# Patient Record
Sex: FEMALE | Race: BLACK OR AFRICAN AMERICAN | NOT HISPANIC OR LATINO | URBAN - METROPOLITAN AREA
[De-identification: names, ages, dates, MRNs, and addresses within clinical notes are randomized per-mention and may not be internally consistent; named-entity substitution may affect disease eponyms.]

---

## 2019-11-09 ENCOUNTER — INPATIENT (INPATIENT)
Facility: HOSPITAL | Age: 79
LOS: 8 days | Discharge: HOME HEALTH | End: 2019-11-18
Attending: INTERNAL MEDICINE | Admitting: INTERNAL MEDICINE
Payer: MEDICARE

## 2019-11-09 VITALS
RESPIRATION RATE: 18 BRPM | DIASTOLIC BLOOD PRESSURE: 69 MMHG | SYSTOLIC BLOOD PRESSURE: 124 MMHG | HEIGHT: 66 IN | TEMPERATURE: 101 F | HEART RATE: 118 BPM | OXYGEN SATURATION: 100 % | WEIGHT: 134.92 LBS

## 2019-11-09 LAB
ALBUMIN SERPL ELPH-MCNC: 3.1 G/DL — LOW (ref 3.3–5)
ALP SERPL-CCNC: 157 U/L — HIGH (ref 40–120)
ALT FLD-CCNC: 38 U/L — SIGNIFICANT CHANGE UP (ref 12–78)
ANION GAP SERPL CALC-SCNC: 9 MMOL/L — SIGNIFICANT CHANGE UP (ref 5–17)
APTT BLD: 24.6 SEC — LOW (ref 27.5–36.3)
AST SERPL-CCNC: 30 U/L — SIGNIFICANT CHANGE UP (ref 15–37)
BASOPHILS # BLD AUTO: 0.03 K/UL — SIGNIFICANT CHANGE UP (ref 0–0.2)
BASOPHILS NFR BLD AUTO: 0.2 % — SIGNIFICANT CHANGE UP (ref 0–2)
BILIRUB SERPL-MCNC: 0.3 MG/DL — SIGNIFICANT CHANGE UP (ref 0.2–1.2)
BUN SERPL-MCNC: 21 MG/DL — SIGNIFICANT CHANGE UP (ref 7–23)
CALCIUM SERPL-MCNC: 9.1 MG/DL — SIGNIFICANT CHANGE UP (ref 8.5–10.1)
CHLORIDE SERPL-SCNC: 100 MMOL/L — SIGNIFICANT CHANGE UP (ref 96–108)
CO2 SERPL-SCNC: 25 MMOL/L — SIGNIFICANT CHANGE UP (ref 22–31)
CREAT SERPL-MCNC: 1.22 MG/DL — SIGNIFICANT CHANGE UP (ref 0.5–1.3)
EOSINOPHIL # BLD AUTO: 0.12 K/UL — SIGNIFICANT CHANGE UP (ref 0–0.5)
EOSINOPHIL NFR BLD AUTO: 0.9 % — SIGNIFICANT CHANGE UP (ref 0–6)
GLUCOSE SERPL-MCNC: 231 MG/DL — HIGH (ref 70–99)
HCT VFR BLD CALC: 32.9 % — LOW (ref 34.5–45)
HGB BLD-MCNC: 10.8 G/DL — LOW (ref 11.5–15.5)
IMM GRANULOCYTES NFR BLD AUTO: 0.7 % — SIGNIFICANT CHANGE UP (ref 0–1.5)
INR BLD: 1.21 RATIO — HIGH (ref 0.88–1.16)
LACTATE SERPL-SCNC: 2.3 MMOL/L — HIGH (ref 0.7–2)
LYMPHOCYTES # BLD AUTO: 1.54 K/UL — SIGNIFICANT CHANGE UP (ref 1–3.3)
LYMPHOCYTES # BLD AUTO: 11.1 % — LOW (ref 13–44)
MCHC RBC-ENTMCNC: 29.7 PG — SIGNIFICANT CHANGE UP (ref 27–34)
MCHC RBC-ENTMCNC: 32.8 GM/DL — SIGNIFICANT CHANGE UP (ref 32–36)
MCV RBC AUTO: 90.4 FL — SIGNIFICANT CHANGE UP (ref 80–100)
MONOCYTES # BLD AUTO: 1.16 K/UL — HIGH (ref 0–0.9)
MONOCYTES NFR BLD AUTO: 8.4 % — SIGNIFICANT CHANGE UP (ref 2–14)
NEUTROPHILS # BLD AUTO: 10.9 K/UL — HIGH (ref 1.8–7.4)
NEUTROPHILS NFR BLD AUTO: 78.7 % — HIGH (ref 43–77)
NRBC # BLD: 0 /100 WBCS — SIGNIFICANT CHANGE UP (ref 0–0)
NT-PROBNP SERPL-SCNC: 229 PG/ML — SIGNIFICANT CHANGE UP (ref 0–450)
PLATELET # BLD AUTO: 328 K/UL — SIGNIFICANT CHANGE UP (ref 150–400)
POTASSIUM SERPL-MCNC: 4.2 MMOL/L — SIGNIFICANT CHANGE UP (ref 3.5–5.3)
POTASSIUM SERPL-SCNC: 4.2 MMOL/L — SIGNIFICANT CHANGE UP (ref 3.5–5.3)
PROT SERPL-MCNC: 7.9 GM/DL — SIGNIFICANT CHANGE UP (ref 6–8.3)
PROTHROM AB SERPL-ACNC: 13.6 SEC — HIGH (ref 10–12.9)
RBC # BLD: 3.64 M/UL — LOW (ref 3.8–5.2)
RBC # FLD: 12.6 % — SIGNIFICANT CHANGE UP (ref 10.3–14.5)
SODIUM SERPL-SCNC: 134 MMOL/L — LOW (ref 135–145)
TROPONIN I SERPL-MCNC: <.015 NG/ML — SIGNIFICANT CHANGE UP (ref 0.01–0.04)
WBC # BLD: 13.84 K/UL — HIGH (ref 3.8–10.5)
WBC # FLD AUTO: 13.84 K/UL — HIGH (ref 3.8–10.5)

## 2019-11-09 PROCEDURE — 99285 EMERGENCY DEPT VISIT HI MDM: CPT

## 2019-11-09 PROCEDURE — 71275 CT ANGIOGRAPHY CHEST: CPT | Mod: 26

## 2019-11-09 PROCEDURE — 99223 1ST HOSP IP/OBS HIGH 75: CPT | Mod: AI

## 2019-11-09 PROCEDURE — 93010 ELECTROCARDIOGRAM REPORT: CPT

## 2019-11-09 PROCEDURE — 71045 X-RAY EXAM CHEST 1 VIEW: CPT | Mod: 26

## 2019-11-09 RX ORDER — ACETAMINOPHEN 500 MG
975 TABLET ORAL ONCE
Refills: 0 | Status: COMPLETED | OUTPATIENT
Start: 2019-11-09 | End: 2019-11-09

## 2019-11-09 RX ORDER — PIPERACILLIN AND TAZOBACTAM 4; .5 G/20ML; G/20ML
3.38 INJECTION, POWDER, LYOPHILIZED, FOR SOLUTION INTRAVENOUS ONCE
Refills: 0 | Status: COMPLETED | OUTPATIENT
Start: 2019-11-09 | End: 2019-11-10

## 2019-11-09 RX ORDER — CEFTRIAXONE 500 MG/1
1000 INJECTION, POWDER, FOR SOLUTION INTRAMUSCULAR; INTRAVENOUS ONCE
Refills: 0 | Status: COMPLETED | OUTPATIENT
Start: 2019-11-09 | End: 2019-11-09

## 2019-11-09 RX ORDER — IPRATROPIUM/ALBUTEROL SULFATE 18-103MCG
3 AEROSOL WITH ADAPTER (GRAM) INHALATION ONCE
Refills: 0 | Status: COMPLETED | OUTPATIENT
Start: 2019-11-09 | End: 2019-11-10

## 2019-11-09 RX ORDER — IPRATROPIUM/ALBUTEROL SULFATE 18-103MCG
3 AEROSOL WITH ADAPTER (GRAM) INHALATION ONCE
Refills: 0 | Status: COMPLETED | OUTPATIENT
Start: 2019-11-09 | End: 2019-11-09

## 2019-11-09 RX ORDER — AZITHROMYCIN 500 MG/1
500 TABLET, FILM COATED ORAL ONCE
Refills: 0 | Status: COMPLETED | OUTPATIENT
Start: 2019-11-09 | End: 2019-11-09

## 2019-11-09 RX ORDER — SODIUM CHLORIDE 9 MG/ML
2000 INJECTION INTRAMUSCULAR; INTRAVENOUS; SUBCUTANEOUS ONCE
Refills: 0 | Status: COMPLETED | OUTPATIENT
Start: 2019-11-09 | End: 2019-11-09

## 2019-11-09 RX ADMIN — CEFTRIAXONE 100 MILLIGRAM(S): 500 INJECTION, POWDER, FOR SOLUTION INTRAMUSCULAR; INTRAVENOUS at 20:35

## 2019-11-09 RX ADMIN — Medication 975 MILLIGRAM(S): at 20:36

## 2019-11-09 RX ADMIN — Medication 125 MILLIGRAM(S): at 20:36

## 2019-11-09 RX ADMIN — SODIUM CHLORIDE 2000 MILLILITER(S): 9 INJECTION INTRAMUSCULAR; INTRAVENOUS; SUBCUTANEOUS at 20:05

## 2019-11-09 RX ADMIN — AZITHROMYCIN 500 MILLIGRAM(S): 500 TABLET, FILM COATED ORAL at 21:05

## 2019-11-09 RX ADMIN — Medication 3 MILLILITER(S): at 20:35

## 2019-11-09 RX ADMIN — Medication 3 MILLILITER(S): at 21:02

## 2019-11-09 NOTE — ED ADULT NURSE NOTE - OBJECTIVE STATEMENT
Cardiac
79 y.o female with hx of hdl, htn and cancer complains of sob and wheezes. pt is code sepsis

## 2019-11-09 NOTE — ED PROVIDER NOTE - OBJECTIVE STATEMENT
[email or web address] [phone] Pertinent PMH/PSH/FHx/SHx and Review of Systems contained within:    80yo F w PMH of HTN, DM, HL, hx thyroid surgery x3, lung ca scheduled for surgery Tuesday presents to ED for eval of SOB & wheeze.  No previous hx asthma, no hx smoking/EtOH.  Pt states sx severe x1d.  Pt febrile in triage.  Denies previous fever, recent travel, known sick contacts.  Pt states cough productive of sputum, few streaks of blood.  Denies dizziness, syncope.  PMD Dr. James Pertinent PMH/PSH/FHx/SHx and Review of Systems contained within:    80yo F w PMH of HTN, DM, HL, hx thyroid surgery x3, lung ca scheduled for surgery Tuesday presents to ED for eval of SOB & wheeze.  No previous hx asthma, no hx smoking/EtOH.  Pt states sx severe x1d.  Pt febrile in triage.  Denies previous fever, recent travel, known sick contacts.  Pt states cough productive of sputum, few streaks of blood.  Denies dizziness, syncope.  PMD Dr. James    +fever, No photophobia/eye pain/changes in vision, No ear pain/sore throat, No palpitations, +SOB/cough/wheeze, No abdominal pain, No neck/back pain, no rash, no changes in neurological status/function.

## 2019-11-09 NOTE — H&P ADULT - NSICDXPASTMEDICALHX_GEN_ALL_CORE_FT
PAST MEDICAL HISTORY:  Diabetes type 2, uncontrolled     Essential hypertension     Hypothyroidism     Lung cancer

## 2019-11-09 NOTE — H&P ADULT - HISTORY OF PRESENT ILLNESS
Pt is a 78 y/o female w/pmhx of HTN, HLD, DM2, Thyroid ca s/p surgeryx3  20+yrs ago, R lung ca s/p resection at [facility], was there for f/u in July and found to have a possible new mass on R and is scheduled for Bronch on coming tuesday.  Pt reports over the last 2-3 days she has been noticing wheezing and gets a bout of cough and along w/that sob.  she is not bringing anything up with cough. States today she had a bout of coughing and couldnt catch her breat after and son called ems.  she had denied any fevers at home, but febrile in ed.  pt denies any sick contacts or travels, no cp, palpitations, n/v/d/c.  she had seen pmd few days ago for this and was started on inhalers and states felt a bit better but when gets cough gets the sob.

## 2019-11-09 NOTE — ED ADULT TRIAGE NOTE - CHIEF COMPLAINT QUOTE
SOB for one day , blood sputum, just gat prescription for asthma meds , feels better after treatment

## 2019-11-09 NOTE — ED PROVIDER NOTE - PHYSICAL EXAMINATION
Gen: Alert, speaking in complete sentences  Head: NC, AT, EOMI, normal lids/conjunctiva  ENT: normal hearing, patent oropharynx, MMM  Neck: supple, no tenderness/meningismus, FROM, Trachea midline  Pulm: Bilateral wheeze & rales  CV: RRR, no M/R/G, +dist pulses  Abd: soft, NT/ND, +BS, no guarding/rebound tenderness  Mskel: no edema/erythema/cyanosis  Skin: no rash  Neuro: no sensory/motor deficits

## 2019-11-09 NOTE — H&P ADULT - NSHPPHYSICALEXAM_GEN_ALL_CORE
Vital Signs Last 24 Hrs  T(C): 37 (10 Nov 2019 03:36), Max: 38.3 (09 Nov 2019 19:35)  T(F): 98.6 (10 Nov 2019 03:36), Max: 100.9 (09 Nov 2019 19:35)  HR: 100 (10 Nov 2019 05:47) (84 - 118)  BP: 160/84 (10 Nov 2019 05:45) (106/69 - 160/84)  BP(mean): --  RR: 20 (10 Nov 2019 05:45) (15 - 20)  SpO2: 97% (10 Nov 2019 05:47) (97% - 100%)    PHYSICAL EXAM:    GENERAL: NAD, well-groomed, well-developed  HEAD:  Atraumatic, Normocephalic  EYES: EOMI, PERRLA, conjunctiva and sclera clear  ENMT: No tonsillar erythema, exudates, or enlargement; Moist mucous membranes, No lesions  NECK: Supple, No JVD, Normal thyroid  NERVOUS SYSTEM:  Alert & Oriented X3, Good concentration; Motor Strength 5/5 B/L upper and lower extremities; DTRs 2+ intact and symmetric  CHEST/LUNG: mild wheezing through out b/l  HEART: Regular rate and rhythm; No rubs, or gallops, +S1,S2  ABDOMEN: Soft, Nontender, Nondistended; Bowel sounds present  EXTREMITIES:  2+ Peripheral Pulses, No clubbing, cyanosis, or edema  LYMPH: No cervical adenopathy  RECTAL: deferred  BREAST: deferred  : deferred  SKIN: No rashes or lesions    IMPROVE VTE Individual Risk Assessment          RISK                                                          Points  [  ] Previous VTE                                                3  [  ] Thrombophilia                                             2  [  ] Lower limb paralysis                                    2        (unable to hold up >15 seconds)    [  ] Current Cancer                                             2         (within 6 months)  [ x ] Immobilization > 24 hrs                              1  [  ] ICU/CCU stay > 24 hours                            1  [ x ] Age > 60                                                    1  IMPROVE VTE Score __2_______

## 2019-11-09 NOTE — H&P ADULT - NSHPLABSRESULTS_GEN_ALL_CORE
LABS:                        10.8   13.84 )-----------( 328      ( 2019 20:23 )             32.9         134<L>  |  100  |  21  ----------------------------<  231<H>  4.2   |  25  |  1.22    Ca    9.1      2019 20:23    TPro  7.9  /  Alb  3.1<L>  /  TBili  0.3  /  DBili  x   /  AST  30  /  ALT  38  /  AlkPhos  157<H>      PT/INR - ( 2019 20:23 )   PT: 13.6 sec;   INR: 1.21 ratio         PTT - ( 2019 20:23 )  PTT:24.6 sec  Urinalysis Basic - ( 10 Nov 2019 00:15 )    Color: Yellow / Appearance: Clear / S.005 / pH: x  Gluc: x / Ketone: Negative  / Bili: Negative / Urobili: Negative mg/dL   Blood: x / Protein: 15 mg/dL / Nitrite: Negative   Leuk Esterase: Trace / RBC: 0-2 /HPF / WBC 3-5   Sq Epi: x / Non Sq Epi: Few / Bacteria: Occasional      CAPILLARY BLOOD GLUCOSE          RADIOLOGY & ADDITIONAL TESTS:    Imaging Personally Reviewed:  [ X] YES  [ ] NO

## 2019-11-09 NOTE — ED PROVIDER NOTE - CLINICAL SUMMARY MEDICAL DECISION MAKING FREE TEXT BOX
Pt w above dx, initially given abx for CAP, later added broad spectrum abx due to pt recent multiple hospital visits for cancer work up.  Discussed w pt PMD, admitted for further eval/mgmt.

## 2019-11-10 DIAGNOSIS — Z98.890 OTHER SPECIFIED POSTPROCEDURAL STATES: Chronic | ICD-10-CM

## 2019-11-10 DIAGNOSIS — J15.6 PNEUMONIA DUE TO OTHER GRAM-NEGATIVE BACTERIA: ICD-10-CM

## 2019-11-10 DIAGNOSIS — E11.65 TYPE 2 DIABETES MELLITUS WITH HYPERGLYCEMIA: ICD-10-CM

## 2019-11-10 DIAGNOSIS — R06.02 SHORTNESS OF BREATH: ICD-10-CM

## 2019-11-10 DIAGNOSIS — E89.0 POSTPROCEDURAL HYPOTHYROIDISM: ICD-10-CM

## 2019-11-10 DIAGNOSIS — Z29.9 ENCOUNTER FOR PROPHYLACTIC MEASURES, UNSPECIFIED: ICD-10-CM

## 2019-11-10 DIAGNOSIS — J40 BRONCHITIS, NOT SPECIFIED AS ACUTE OR CHRONIC: ICD-10-CM

## 2019-11-10 DIAGNOSIS — C34.91 MALIGNANT NEOPLASM OF UNSPECIFIED PART OF RIGHT BRONCHUS OR LUNG: ICD-10-CM

## 2019-11-10 LAB
APPEARANCE UR: CLEAR — SIGNIFICANT CHANGE UP
BACTERIA # UR AUTO: ABNORMAL
BILIRUB UR-MCNC: NEGATIVE — SIGNIFICANT CHANGE UP
COLOR SPEC: YELLOW — SIGNIFICANT CHANGE UP
DIFF PNL FLD: ABNORMAL
EPI CELLS # UR: SIGNIFICANT CHANGE UP
FLU A RESULT: SIGNIFICANT CHANGE UP
FLU A RESULT: SIGNIFICANT CHANGE UP
FLUAV AG NPH QL: SIGNIFICANT CHANGE UP
FLUBV AG NPH QL: SIGNIFICANT CHANGE UP
GLUCOSE BLDC GLUCOMTR-MCNC: 367 MG/DL — HIGH (ref 70–99)
GLUCOSE BLDC GLUCOMTR-MCNC: 371 MG/DL — HIGH (ref 70–99)
GLUCOSE BLDC GLUCOMTR-MCNC: 391 MG/DL — HIGH (ref 70–99)
GLUCOSE UR QL: 50 MG/DL
KETONES UR-MCNC: NEGATIVE — SIGNIFICANT CHANGE UP
LACTATE SERPL-SCNC: 1.5 MMOL/L — SIGNIFICANT CHANGE UP (ref 0.7–2)
LEUKOCYTE ESTERASE UR-ACNC: ABNORMAL
NITRITE UR-MCNC: NEGATIVE — SIGNIFICANT CHANGE UP
PH UR: 6 — SIGNIFICANT CHANGE UP (ref 5–8)
PROT UR-MCNC: 15 MG/DL
RBC CASTS # UR COMP ASSIST: SIGNIFICANT CHANGE UP /HPF (ref 0–4)
RSV RESULT: SIGNIFICANT CHANGE UP
RSV RNA RESP QL NAA+PROBE: SIGNIFICANT CHANGE UP
SP GR SPEC: 1 — LOW (ref 1.01–1.02)
UROBILINOGEN FLD QL: NEGATIVE MG/DL — SIGNIFICANT CHANGE UP
WBC UR QL: SIGNIFICANT CHANGE UP

## 2019-11-10 PROCEDURE — 99233 SBSQ HOSP IP/OBS HIGH 50: CPT

## 2019-11-10 RX ORDER — ACETYLCYSTEINE 200 MG/ML
2 VIAL (ML) MISCELLANEOUS EVERY 6 HOURS
Refills: 0 | Status: COMPLETED | OUTPATIENT
Start: 2019-11-10 | End: 2019-11-11

## 2019-11-10 RX ORDER — OMEPRAZOLE 10 MG/1
0 CAPSULE, DELAYED RELEASE ORAL
Qty: 30 | Refills: 0 | DISCHARGE

## 2019-11-10 RX ORDER — DEXTROSE 50 % IN WATER 50 %
12.5 SYRINGE (ML) INTRAVENOUS ONCE
Refills: 0 | Status: DISCONTINUED | OUTPATIENT
Start: 2019-11-10 | End: 2019-11-18

## 2019-11-10 RX ORDER — TIOTROPIUM BROMIDE 18 UG/1
1 CAPSULE ORAL; RESPIRATORY (INHALATION) DAILY
Refills: 0 | Status: DISCONTINUED | OUTPATIENT
Start: 2019-11-10 | End: 2019-11-18

## 2019-11-10 RX ORDER — IPRATROPIUM/ALBUTEROL SULFATE 18-103MCG
3 AEROSOL WITH ADAPTER (GRAM) INHALATION ONCE
Refills: 0 | Status: COMPLETED | OUTPATIENT
Start: 2019-11-10 | End: 2019-11-10

## 2019-11-10 RX ORDER — GLUCAGON INJECTION, SOLUTION 0.5 MG/.1ML
1 INJECTION, SOLUTION SUBCUTANEOUS ONCE
Refills: 0 | Status: DISCONTINUED | OUTPATIENT
Start: 2019-11-10 | End: 2019-11-18

## 2019-11-10 RX ORDER — INSULIN LISPRO 100/ML
VIAL (ML) SUBCUTANEOUS
Refills: 0 | Status: DISCONTINUED | OUTPATIENT
Start: 2019-11-10 | End: 2019-11-18

## 2019-11-10 RX ORDER — AZITHROMYCIN 500 MG/1
500 TABLET, FILM COATED ORAL EVERY 24 HOURS
Refills: 0 | Status: DISCONTINUED | OUTPATIENT
Start: 2019-11-10 | End: 2019-11-14

## 2019-11-10 RX ORDER — PIPERACILLIN AND TAZOBACTAM 4; .5 G/20ML; G/20ML
3.38 INJECTION, POWDER, LYOPHILIZED, FOR SOLUTION INTRAVENOUS EVERY 8 HOURS
Refills: 0 | Status: DISCONTINUED | OUTPATIENT
Start: 2019-11-10 | End: 2019-11-14

## 2019-11-10 RX ORDER — PANTOPRAZOLE SODIUM 20 MG/1
40 TABLET, DELAYED RELEASE ORAL
Refills: 0 | Status: DISCONTINUED | OUTPATIENT
Start: 2019-11-10 | End: 2019-11-18

## 2019-11-10 RX ORDER — DEXTROSE 50 % IN WATER 50 %
25 SYRINGE (ML) INTRAVENOUS ONCE
Refills: 0 | Status: DISCONTINUED | OUTPATIENT
Start: 2019-11-10 | End: 2019-11-18

## 2019-11-10 RX ORDER — IPRATROPIUM/ALBUTEROL SULFATE 18-103MCG
3 AEROSOL WITH ADAPTER (GRAM) INHALATION EVERY 6 HOURS
Refills: 0 | Status: DISCONTINUED | OUTPATIENT
Start: 2019-11-10 | End: 2019-11-12

## 2019-11-10 RX ORDER — ALBUTEROL 90 UG/1
1 AEROSOL, METERED ORAL EVERY 4 HOURS
Refills: 0 | Status: COMPLETED | OUTPATIENT
Start: 2019-11-10 | End: 2020-10-08

## 2019-11-10 RX ORDER — GABAPENTIN 400 MG/1
300 CAPSULE ORAL
Refills: 0 | Status: DISCONTINUED | OUTPATIENT
Start: 2019-11-10 | End: 2019-11-18

## 2019-11-10 RX ORDER — INSULIN LISPRO 100/ML
VIAL (ML) SUBCUTANEOUS AT BEDTIME
Refills: 0 | Status: DISCONTINUED | OUTPATIENT
Start: 2019-11-10 | End: 2019-11-18

## 2019-11-10 RX ORDER — SODIUM CHLORIDE 9 MG/ML
1000 INJECTION, SOLUTION INTRAVENOUS
Refills: 0 | Status: DISCONTINUED | OUTPATIENT
Start: 2019-11-10 | End: 2019-11-18

## 2019-11-10 RX ORDER — LOSARTAN POTASSIUM 100 MG/1
100 TABLET, FILM COATED ORAL DAILY
Refills: 0 | Status: DISCONTINUED | OUTPATIENT
Start: 2019-11-10 | End: 2019-11-18

## 2019-11-10 RX ORDER — LETROZOLE 2.5 MG/1
2.5 TABLET, FILM COATED ORAL DAILY
Refills: 0 | Status: DISCONTINUED | OUTPATIENT
Start: 2019-11-10 | End: 2019-11-18

## 2019-11-10 RX ORDER — DEXTROSE 50 % IN WATER 50 %
15 SYRINGE (ML) INTRAVENOUS ONCE
Refills: 0 | Status: DISCONTINUED | OUTPATIENT
Start: 2019-11-10 | End: 2019-11-18

## 2019-11-10 RX ORDER — LEVOTHYROXINE SODIUM 125 MCG
137 TABLET ORAL DAILY
Refills: 0 | Status: DISCONTINUED | OUTPATIENT
Start: 2019-11-10 | End: 2019-11-14

## 2019-11-10 RX ADMIN — AZITHROMYCIN 255 MILLIGRAM(S): 500 TABLET, FILM COATED ORAL at 07:40

## 2019-11-10 RX ADMIN — Medication 3 MILLILITER(S): at 02:44

## 2019-11-10 RX ADMIN — Medication 3 MILLILITER(S): at 15:09

## 2019-11-10 RX ADMIN — Medication 20 MILLIGRAM(S): at 21:58

## 2019-11-10 RX ADMIN — PIPERACILLIN AND TAZOBACTAM 25 GRAM(S): 4; .5 INJECTION, POWDER, LYOPHILIZED, FOR SOLUTION INTRAVENOUS at 16:17

## 2019-11-10 RX ADMIN — Medication 2 MILLILITER(S): at 23:46

## 2019-11-10 RX ADMIN — LETROZOLE 2.5 MILLIGRAM(S): 2.5 TABLET, FILM COATED ORAL at 12:07

## 2019-11-10 RX ADMIN — Medication 2 MILLILITER(S): at 17:05

## 2019-11-10 RX ADMIN — Medication 3 MILLILITER(S): at 17:05

## 2019-11-10 RX ADMIN — PIPERACILLIN AND TAZOBACTAM 200 GRAM(S): 4; .5 INJECTION, POWDER, LYOPHILIZED, FOR SOLUTION INTRAVENOUS at 02:14

## 2019-11-10 RX ADMIN — Medication 3 MILLILITER(S): at 11:02

## 2019-11-10 RX ADMIN — PIPERACILLIN AND TAZOBACTAM 25 GRAM(S): 4; .5 INJECTION, POWDER, LYOPHILIZED, FOR SOLUTION INTRAVENOUS at 21:58

## 2019-11-10 RX ADMIN — LOSARTAN POTASSIUM 100 MILLIGRAM(S): 100 TABLET, FILM COATED ORAL at 17:59

## 2019-11-10 RX ADMIN — Medication 3 MILLILITER(S): at 05:47

## 2019-11-10 RX ADMIN — Medication 10: at 16:17

## 2019-11-10 RX ADMIN — Medication 40 MILLIGRAM(S): at 13:52

## 2019-11-10 RX ADMIN — Medication 3 MILLILITER(S): at 23:46

## 2019-11-10 RX ADMIN — Medication 10: at 12:07

## 2019-11-10 RX ADMIN — GABAPENTIN 300 MILLIGRAM(S): 400 CAPSULE ORAL at 17:58

## 2019-11-10 RX ADMIN — Medication 6: at 22:04

## 2019-11-10 NOTE — PATIENT PROFILE ADULT - NSTOBACCONEVERSMOKERY/N_GEN_A
Ambulatory with steady gait.  No new distress and no further questions or concerns.  Expressed understanding of discharge information and medications.    
Dr. Ch at bedside.   
No

## 2019-11-10 NOTE — PHYSICAL THERAPY INITIAL EVALUATION ADULT - IMPAIRMENTS FOUND, PT EVAL
aerobic capacity/endurance/muscle strength/posture/gait, locomotion, and balance/joint integrity and mobility

## 2019-11-10 NOTE — PROGRESS NOTE ADULT - PROBLEM SELECTOR PLAN 1
- large subcarinal mass, bilateral hilar nodes, left hepatic mass.  - follow up blood cultures  - nebs treatment.  - Patient has known h/o lung cancer for which she follows at [facility] in [place].  - pulmonary and ID eval were requested.  - She will follow up with her oncologist upon discharge.

## 2019-11-10 NOTE — PHYSICAL THERAPY INITIAL EVALUATION ADULT - GENERAL OBSERVATIONS, REHAB EVAL
Chart (EMR) reviewed. Received supine c HOB elevated, NAD. +cardiac monitor, +O2 via nasal cannula, +heplock. Family present. Alert. Ox4. Able to follow multistep commands/directions.

## 2019-11-10 NOTE — PROGRESS NOTE ADULT - PROBLEM SELECTOR PLAN 5
- follow up with oncologist at [facility] upon discharge.  - She is scheduled for bronchoscopy next Tuesday.

## 2019-11-10 NOTE — PROGRESS NOTE ADULT - SUBJECTIVE AND OBJECTIVE BOX
Patient is a 79y old  Female who presents with a chief complaint of sob (2019 23:56), she has no pain, no SOB.    OVERNIGHT EVENTS: none    MEDICATIONS  (STANDING):  ALBUTerol    90 MICROgram(s) HFA Inhaler 1 Puff(s) Inhalation every 4 hours  albuterol/ipratropium for Nebulization 3 milliLiter(s) Nebulizer every 6 hours  azithromycin  IVPB 500 milliGRAM(s) IV Intermittent every 24 hours  dextrose 5%. 1000 milliLiter(s) (50 mL/Hr) IV Continuous <Continuous>  dextrose 50% Injectable 12.5 Gram(s) IV Push once  dextrose 50% Injectable 25 Gram(s) IV Push once  dextrose 50% Injectable 25 Gram(s) IV Push once  gabapentin 300 milliGRAM(s) Oral two times a day  insulin lispro (HumaLOG) corrective regimen sliding scale   SubCutaneous three times a day before meals  insulin lispro (HumaLOG) corrective regimen sliding scale   SubCutaneous at bedtime  letrozole 2.5 milliGRAM(s) Oral daily  levothyroxine 137 MICROGram(s) Oral daily  losartan 100 milliGRAM(s) Oral daily  pantoprazole    Tablet 40 milliGRAM(s) Oral before breakfast  piperacillin/tazobactam IVPB.. 3.375 Gram(s) IV Intermittent every 8 hours  tiotropium 18 MICROgram(s) Capsule 1 Capsule(s) Inhalation daily    MEDICATIONS  (PRN):  dextrose 40% Gel 15 Gram(s) Oral once PRN Blood Glucose LESS THAN 70 milliGRAM(s)/deciliter  glucagon  Injectable 1 milliGRAM(s) IntraMuscular once PRN Glucose LESS THAN 70 milligrams/deciliter    REVIEW OF SYSTEMS:  CONSTITUTIONAL: No fever,    EYES: No eye pain,    ENMT:    No sinus or throat pain  NECK: No pain or stiffness  RESPIRATORY:  No shortness of breath  CARDIOVASCULAR: No chest pain, palpitations,   GASTROINTESTINAL: No abdominal or epigastric pain. No nausea, vomiting,   GENITOURINARY: No dysuria,    NEUROLOGICAL: No headaches,    SKIN: No itching,       Vital Signs Last 24 Hrs  T(C): 37 (10 Nov 2019 03:36), Max: 38.3 (2019 19:35)  T(F): 98.6 (10 Nov 2019 03:36), Max: 100.9 (2019 19:35)  HR: 100 (10 Nov 2019 05:47) (84 - 118)  BP: 160/84 (10 Nov 2019 05:45) (106/69 - 160/84)  BP(mean): --  RR: 20 (10 Nov 2019 05:45) (15 - 20)  SpO2: 97% (10 Nov 2019 05:47) (97% - 100%)    PHYSICAL EXAM:  GENERAL: NAD, well-groomed, well-developed  HEAD:  Atraumatic, Normocephalic  EYES: EOMI, PERRLA, conjunctiva and sclera clear  ENMT:  Moist mucous membranes   NECK: Supple, No JVD   NERVOUS SYSTEM:  Alert & Oriented X 3, Good concentration; Motor Strength 5/5 B/L upper and lower extremities; DTRs 2+ intact and symmetric  CHEST/LUNG: Good air entry bilaterally with decreased breath sounds at bases; mild to moderate wheezing, or rubs  HEART: Regular rate and rhythm; No murmurs, rubs, or gallops  ABDOMEN: Soft, Nontender, Nondistended; Bowel sounds present  EXTREMITIES:  2+ Peripheral Pulses, No clubbing, cyanosis, or edema  LYMPH: No lymphadenopathy noted  SKIN: No rashes or lesions    LABS:                        10.8   13.84 )-----------( 328      ( 2019 20:23 )             32.9         134<L>  |  100  |  21  ----------------------------<  231<H>  4.2   |  25  |  1.22    Ca    9.1      2019 20:23    TPro  7.9  /  Alb  3.1<L>  /  TBili  0.3  /  DBili  x   /  AST  30  /  ALT  38  /  AlkPhos  157<H>  11-09    PT/INR - ( 2019 20:23 )   PT: 13.6 sec;   INR: 1.21 ratio         PTT - ( 2019 20:23 )  PTT:24.6 sec   cardiac markers Troponin <.015  CK --    Urinalysis Basic - ( 10 Nov 2019 00:15 )    Color: Yellow / Appearance: Clear / S.005 / pH: x  Gluc: x / Ketone: Negative  / Bili: Negative / Urobili: Negative mg/dL   Blood: x / Protein: 15 mg/dL / Nitrite: Negative   Leuk Esterase: Trace / RBC: 0-2 /HPF / WBC 3-5   Sq Epi: x / Non Sq Epi: Few / Bacteria: Occasional      CAPILLARY BLOOD GLUCOSE        Cultures    RADIOLOGY & ADDITIONAL TESTS:    Imaging Personally Reviewed:  [ ] YES  [ ] NO    Consultant(s) Notes Reviewed:  [ ] YES  [ ] NO    Care Discussed with Consultants/Other Providers [ ] YES  [ ] NO

## 2019-11-10 NOTE — PHYSICAL THERAPY INITIAL EVALUATION ADULT - ADDITIONAL COMMENTS
Patient lives c son in a pvt house c 7 entry steps c B/L rails, 1 level inside home. Independent c all ADL's and ambulation with straight cane.

## 2019-11-10 NOTE — ED ADULT NURSE REASSESSMENT NOTE - NS ED NURSE REASSESS COMMENT FT1
Assumed care of pt at 2300 hours, needs attended to. Will continue to monitor and update with changes

## 2019-11-10 NOTE — PHYSICAL THERAPY INITIAL EVALUATION ADULT - GAIT TRAINING, PT EVAL
Pt will improve ambulation to ~ 300 feet Independently using straight cane within 2 weeks. Pt will negotiate ~ 7 steps c straight cane and rail Independently within 2 weeks.

## 2019-11-10 NOTE — CONSULT NOTE ADULT - SUBJECTIVE AND OBJECTIVE BOX
Patient is a 79y old  Female who presents with a chief complaint of sob (10 Nov 2019 10:14)    HPI:  78 y/o female with  HTN, HLD, DM2, Thyroid ca s/p surgeryx3  20+yrs ago, Hypothyroidism, R lung CA(says thyroid metastatic) s/p Radiation  at [facility] was there for f/u in July and found to have a possible new mass on  and is scheduled for Bronch on coming tuesday.    Pt reports over the last 2-3 days she has been noticing wheezing and gets a bout of cough and along w/that sob.  Not bringing anything up with cough. Had a bout of coughing and couldn't catch her breat afterwards, so son called EMS.  Denies any fevers at home, but febrile in ED..  No sick contacts or travels, no cp, palpitations, n/v/d/c.  Seen by  PMD  few days ago for this and was started on inhalers and states felt a bit better but when gets cough gets the sob.  Non smoker.    PAST MEDICAL & SURGICAL HISTORY:  Diabetes type 2, uncontrolled  Essential hypertension  Lung cancer  Hypothyroidism  History of lung surgery  S/P thyroidectomy    FAMILY HISTORY:  No pertinent family history in first degree relatives    SOCIAL HISTORY: BMI (kg/m2): 27.6 . non smoker    Allergies  sulfa drugs (Unknown)    MEDICATIONS  (STANDING):  ALBUTerol    90 MICROgram(s) HFA Inhaler 1 Puff(s) Inhalation every 4 hours  albuterol/ipratropium for Nebulization 3 milliLiter(s) Nebulizer every 6 hours  azithromycin  IVPB 500 milliGRAM(s) IV Intermittent every 24 hours  dextrose 5%. 1000 milliLiter(s) (50 mL/Hr) IV Continuous <Continuous>  dextrose 50% Injectable 12.5 Gram(s) IV Push once  dextrose 50% Injectable 25 Gram(s) IV Push once  dextrose 50% Injectable 25 Gram(s) IV Push once  gabapentin 300 milliGRAM(s) Oral two times a day  insulin lispro (HumaLOG) corrective regimen sliding scale   SubCutaneous three times a day before meals  insulin lispro (HumaLOG) corrective regimen sliding scale   SubCutaneous at bedtime  letrozole 2.5 milliGRAM(s) Oral daily  levothyroxine 137 MICROGram(s) Oral daily  losartan 100 milliGRAM(s) Oral daily  methylPREDNISolone sodium succinate Injectable 40 milliGRAM(s) IV Push three times a day  pantoprazole    Tablet 40 milliGRAM(s) Oral before breakfast  piperacillin/tazobactam IVPB.. 3.375 Gram(s) IV Intermittent every 8 hours  tiotropium 18 MICROgram(s) Capsule 1 Capsule(s) Inhalation daily    MEDICATIONS  (PRN):  dextrose 40% Gel 15 Gram(s) Oral once PRN Blood Glucose LESS THAN 70 milliGRAM(s)/deciliter  glucagon  Injectable 1 milliGRAM(s) IntraMuscular once PRN Glucose LESS THAN 70 milligrams/deciliter    REVIEW OF SYSTEMS:    Constitutional:            No fever, weight loss or fatigue  HEENT:                      No difficulty hearing, tinnitus, vertigo; No sinus or throat pain  Respiratory:               sob, cough  Cardiovascular:           No chest pain, palpitations  Gastrointestinal:        No abdominal or epigastric pain. No N/V/diarrhea or hematemesis  Genitourinary:            No dysuria, frequency, hematuria or incontinence  SKIN:                             no rash  Musculoskeletal:        No joint pain or swelling  Extremities:                No swelling  Neurological:              No headaches  Psychiatric:                 No depression, anxiety    MACRA & MIPS:  Vaccines - Influenza: no and Pneumovax: No  Tobacco: no  Blood Pressure Screening / Control of: 160/84  Current Medications Reviewed:    Vital Signs Last 24 Hrs  T(C): 36.8 (10 Nov 2019 10:42), Max: 38.3 (2019 19:35)  T(F): 98.2 (10 Nov 2019 10:42), Max: 100.9 (2019 19:35)  HR: 109 (10 Nov 2019 11:11) (84 - 118)  BP: 145/66 (10 Nov 2019 10:42) (106/69 - 160/84)  BP(mean): --  RR: 18 (10 Nov 2019 10:42) (15 - 20)  SpO2: 97% (10 Nov 2019 11:11) (93% - 100%)    PHYSICAL EXAM:  GEN:         Awake, responsive and comfortable.  HEENT:    Normal.    RESP:        decreased air entry.  CVS:             Regular rate and rhythm.   ABD:         Soft, non-tender, non-distended;   :             No costovertebral angle tenderness  SKIN:           Warm and dry.  EXTR:            No clubbing, cyanosis or edema  CNS:              Intact sensory and motor function.  PSYCH:        cooperative, no anxiety or depression    LABS:                        10.8   13.84 )-----------( 328      ( 2019 20:23 )             32.9     11-09    134<L>  |  100  |  21  ----------------------------<  231<H>  4.2   |  25  |  1.22    Ca    9.1      2019 20:23    TPro  7.9  /  Alb  3.1<L>  /  TBili  0.3  /  DBili  x   /  AST  30  /  ALT  38  /  AlkPhos  157<H>      PT/INR - ( 2019 20:23 )   PT: 13.6 sec;   INR: 1.21 ratio         PTT - ( 2019 20:23 )  PTT:24.6 sec    Urinalysis Basic - ( 10 Nov 2019 00:15 )    Color: Yellow / Appearance: Clear / S.005 / pH: x  Gluc: x / Ketone: Negative  / Bili: Negative / Urobili: Negative mg/dL   Blood: x / Protein: 15 mg/dL / Nitrite: Negative   Leuk Esterase: Trace / RBC: 0-2 /HPF / WBC 3-5   Sq Epi: x / Non Sq Epi: Few / Bacteria: Occasional    EKG: sinus tachycardia.    RADIOLOGY & ADDITIONAL STUDIES:  < from: CT Angio Chest w/ IV Cont (19 @ 23:01) >    EXAM:  CT ANGIO CHEST (W)AW IC                          PROCEDURE DATE:  2019      INTERPRETATION:  CLINICAL INFORMATION: Hemoptysis. History of lung cancer.    COMPARISON: Same day radiograph    PROCEDURE:   CT Angiography of the Chest.  90 ml of Omnipaque 350 was injected intravenously. 10 ml were discarded.  Sagittal and coronal reformats were performed as well as 3D (MIP)   reconstructions.    FINDINGS:    LUNGS AND AIRWAYS: Patent central airways.  Lungs are clear.    PLEURA:No pleural effusion.    MEDIASTINUM AND TOI: There is a 4.9 x 3.6 cm sized subcarinal soft   tissue density displacing and encroaching the airways especially in the   right (4-238). Ill-defined soft tissue densities also seen in the right   infrahilar region. A subcentimeter left hilar node is also seen (4-240).   There is mild focal consolidation in the right middle lobe and right   lower lobe which is either postobstructive pneumonitis or pneumonia   (4-275).    VESSELS: No pulmonary thromboembolism in the pulmonary trunk, right and   left main and lobar arteries. Limited evaluation of the segmental   branches.    HEART: Heart size is normal. No pericardial effusion.    CHEST WALL AND LOWER NECK: Within normal limits.    VISUALIZED UPPER ABDOMEN: 3.6 cm sized ill-defined hypodense lesion in   the left hepatic lobe (4-477). Nodular thickening of the left adrenal   gland. 1.2 cm sized left renal cyst.    BONES: Within normal limits.    IMPRESSION:     No central pulmonary embolism.    Large subcarinal mass with bihilar lymph nodes.    Obstructive pneumonitis versus pneumonia in the right middle and lower   lobes.    Left hepatic mass. Cannot exclude malignancy (primary or metastatic).   Further characterization is recommended.    MAYO MISTRY M.D., ATTENDING RADIOLOGIST  This document has been electronically signed. 2019 11:24PM      ASSESSMENT AND PLAN:  ·	SOB.  ·	RML Pneumonia, likely post obstructive.  ·	Lung Mass.  ·	Hypothyroidism.  ·	Thyroid CA history.  ·	HTN.  ·	DM.  ·	Leukocytosis.  ·	Anemia.    Acuteness of symptoms, CT chest findings, fever, leukocytosis and elevated procalcitonin.  Will treat as pneumonia, continue antibiotics.  Continue nebulizer, add Mucomyst.  Short  course of steroids.  Discussed with sons at bed side,

## 2019-11-10 NOTE — PHYSICAL THERAPY INITIAL EVALUATION ADULT - ACTIVE RANGE OF MOTION EXAMINATION, REHAB EVAL
bilateral lower extremity Active ROM was WNL (within normal limits)/sukhjinder. upper extremity Active ROM was WNL (within normal limits)

## 2019-11-11 DIAGNOSIS — J96.01 ACUTE RESPIRATORY FAILURE WITH HYPOXIA: ICD-10-CM

## 2019-11-11 LAB
ALBUMIN SERPL ELPH-MCNC: 3.1 G/DL — LOW (ref 3.3–5)
ALP SERPL-CCNC: 148 U/L — HIGH (ref 40–120)
ALT FLD-CCNC: 34 U/L — SIGNIFICANT CHANGE UP (ref 12–78)
ANION GAP SERPL CALC-SCNC: 6 MMOL/L — SIGNIFICANT CHANGE UP (ref 5–17)
AST SERPL-CCNC: 17 U/L — SIGNIFICANT CHANGE UP (ref 15–37)
BILIRUB SERPL-MCNC: 0.3 MG/DL — SIGNIFICANT CHANGE UP (ref 0.2–1.2)
BUN SERPL-MCNC: 23 MG/DL — SIGNIFICANT CHANGE UP (ref 7–23)
CALCIUM SERPL-MCNC: 9.6 MG/DL — SIGNIFICANT CHANGE UP (ref 8.5–10.1)
CHLORIDE SERPL-SCNC: 102 MMOL/L — SIGNIFICANT CHANGE UP (ref 96–108)
CO2 SERPL-SCNC: 28 MMOL/L — SIGNIFICANT CHANGE UP (ref 22–31)
CREAT SERPL-MCNC: 1.22 MG/DL — SIGNIFICANT CHANGE UP (ref 0.5–1.3)
CULTURE RESULTS: SIGNIFICANT CHANGE UP
GLUCOSE BLDC GLUCOMTR-MCNC: 299 MG/DL — HIGH (ref 70–99)
GLUCOSE BLDC GLUCOMTR-MCNC: 327 MG/DL — HIGH (ref 70–99)
GLUCOSE BLDC GLUCOMTR-MCNC: 354 MG/DL — HIGH (ref 70–99)
GLUCOSE BLDC GLUCOMTR-MCNC: 411 MG/DL — HIGH (ref 70–99)
GLUCOSE BLDC GLUCOMTR-MCNC: 411 MG/DL — HIGH (ref 70–99)
GLUCOSE SERPL-MCNC: 316 MG/DL — HIGH (ref 70–99)
HCT VFR BLD CALC: 34.6 % — SIGNIFICANT CHANGE UP (ref 34.5–45)
HGB BLD-MCNC: 11.1 G/DL — LOW (ref 11.5–15.5)
MCHC RBC-ENTMCNC: 29.4 PG — SIGNIFICANT CHANGE UP (ref 27–34)
MCHC RBC-ENTMCNC: 32.1 GM/DL — SIGNIFICANT CHANGE UP (ref 32–36)
MCV RBC AUTO: 91.8 FL — SIGNIFICANT CHANGE UP (ref 80–100)
NRBC # BLD: 0 /100 WBCS — SIGNIFICANT CHANGE UP (ref 0–0)
PLATELET # BLD AUTO: 353 K/UL — SIGNIFICANT CHANGE UP (ref 150–400)
POTASSIUM SERPL-MCNC: 4.7 MMOL/L — SIGNIFICANT CHANGE UP (ref 3.5–5.3)
POTASSIUM SERPL-SCNC: 4.7 MMOL/L — SIGNIFICANT CHANGE UP (ref 3.5–5.3)
PROT SERPL-MCNC: 7.9 GM/DL — SIGNIFICANT CHANGE UP (ref 6–8.3)
RBC # BLD: 3.77 M/UL — LOW (ref 3.8–5.2)
RBC # FLD: 12.8 % — SIGNIFICANT CHANGE UP (ref 10.3–14.5)
SODIUM SERPL-SCNC: 136 MMOL/L — SIGNIFICANT CHANGE UP (ref 135–145)
SPECIMEN SOURCE: SIGNIFICANT CHANGE UP
WBC # BLD: 12.35 K/UL — HIGH (ref 3.8–10.5)
WBC # FLD AUTO: 12.35 K/UL — HIGH (ref 3.8–10.5)

## 2019-11-11 PROCEDURE — 99233 SBSQ HOSP IP/OBS HIGH 50: CPT

## 2019-11-11 RX ORDER — IPRATROPIUM/ALBUTEROL SULFATE 18-103MCG
3 AEROSOL WITH ADAPTER (GRAM) INHALATION ONCE
Refills: 0 | Status: COMPLETED | OUTPATIENT
Start: 2019-11-11 | End: 2019-11-11

## 2019-11-11 RX ADMIN — Medication 3 MILLILITER(S): at 11:29

## 2019-11-11 RX ADMIN — Medication 12: at 07:57

## 2019-11-11 RX ADMIN — Medication 6: at 12:09

## 2019-11-11 RX ADMIN — PIPERACILLIN AND TAZOBACTAM 25 GRAM(S): 4; .5 INJECTION, POWDER, LYOPHILIZED, FOR SOLUTION INTRAVENOUS at 16:24

## 2019-11-11 RX ADMIN — Medication 3 MILLILITER(S): at 17:41

## 2019-11-11 RX ADMIN — Medication 3 MILLILITER(S): at 05:01

## 2019-11-11 RX ADMIN — Medication 20 MILLIGRAM(S): at 21:13

## 2019-11-11 RX ADMIN — Medication 3 MILLILITER(S): at 06:02

## 2019-11-11 RX ADMIN — Medication 10: at 17:17

## 2019-11-11 RX ADMIN — LOSARTAN POTASSIUM 100 MILLIGRAM(S): 100 TABLET, FILM COATED ORAL at 05:45

## 2019-11-11 RX ADMIN — GABAPENTIN 300 MILLIGRAM(S): 400 CAPSULE ORAL at 17:18

## 2019-11-11 RX ADMIN — Medication 137 MICROGRAM(S): at 05:45

## 2019-11-11 RX ADMIN — Medication 20 MILLIGRAM(S): at 16:24

## 2019-11-11 RX ADMIN — Medication 20 MILLIGRAM(S): at 05:45

## 2019-11-11 RX ADMIN — Medication 2 MILLILITER(S): at 05:01

## 2019-11-11 RX ADMIN — AZITHROMYCIN 255 MILLIGRAM(S): 500 TABLET, FILM COATED ORAL at 07:16

## 2019-11-11 RX ADMIN — GABAPENTIN 300 MILLIGRAM(S): 400 CAPSULE ORAL at 05:44

## 2019-11-11 RX ADMIN — PIPERACILLIN AND TAZOBACTAM 25 GRAM(S): 4; .5 INJECTION, POWDER, LYOPHILIZED, FOR SOLUTION INTRAVENOUS at 05:46

## 2019-11-11 RX ADMIN — Medication 4: at 21:13

## 2019-11-11 RX ADMIN — PANTOPRAZOLE SODIUM 40 MILLIGRAM(S): 20 TABLET, DELAYED RELEASE ORAL at 06:02

## 2019-11-11 RX ADMIN — LETROZOLE 2.5 MILLIGRAM(S): 2.5 TABLET, FILM COATED ORAL at 12:09

## 2019-11-11 RX ADMIN — PIPERACILLIN AND TAZOBACTAM 25 GRAM(S): 4; .5 INJECTION, POWDER, LYOPHILIZED, FOR SOLUTION INTRAVENOUS at 21:12

## 2019-11-11 NOTE — PROGRESS NOTE ADULT - ASSESSMENT
R lung ca s/p resection /  thyroid metastatic s/p Radiation at [facility] present with SOB , likely obstructive pneumonia    on zosyn and zithromax   FU pna work up  fu cultures   NOTE TO CONTINUE   EVALAUTION IN PROGRESS R lung ca s/p resection /  thyroid metastatic s/p Radiation at [facility] present with SOB , likely obstructive pneumonia    on zosyn and zithromax   FU pna work up  fu cultures   procalcitonin will not be reliable on metastasis pt   we will fu  thanks

## 2019-11-11 NOTE — PROGRESS NOTE ADULT - SUBJECTIVE AND OBJECTIVE BOX
Patient is a 79y old  Female who presents with a chief complaint of sob (2019 10:08)      OVERNIGHT EVENTS: none     REVIEW OF SYSTEMS: denies chest pain/SOB, diaphoresis, no F/C, cough, dizziness, headache, blurry vision, nausea, vomiting, abdominal pain. All others review of systems negative     MEDICATIONS  (STANDING):  ALBUTerol    90 MICROgram(s) HFA Inhaler 1 Puff(s) Inhalation every 4 hours  albuterol/ipratropium for Nebulization 3 milliLiter(s) Nebulizer every 6 hours  azithromycin  IVPB 500 milliGRAM(s) IV Intermittent every 24 hours  dextrose 5%. 1000 milliLiter(s) (50 mL/Hr) IV Continuous <Continuous>  dextrose 50% Injectable 12.5 Gram(s) IV Push once  dextrose 50% Injectable 25 Gram(s) IV Push once  dextrose 50% Injectable 25 Gram(s) IV Push once  gabapentin 300 milliGRAM(s) Oral two times a day  insulin lispro (HumaLOG) corrective regimen sliding scale   SubCutaneous three times a day before meals  insulin lispro (HumaLOG) corrective regimen sliding scale   SubCutaneous at bedtime  letrozole 2.5 milliGRAM(s) Oral daily  levothyroxine 137 MICROGram(s) Oral daily  losartan 100 milliGRAM(s) Oral daily  methylPREDNISolone sodium succinate Injectable 20 milliGRAM(s) IV Push every 8 hours  pantoprazole    Tablet 40 milliGRAM(s) Oral before breakfast  piperacillin/tazobactam IVPB.. 3.375 Gram(s) IV Intermittent every 8 hours  tiotropium 18 MICROgram(s) Capsule 1 Capsule(s) Inhalation daily    MEDICATIONS  (PRN):  dextrose 40% Gel 15 Gram(s) Oral once PRN Blood Glucose LESS THAN 70 milliGRAM(s)/deciliter  glucagon  Injectable 1 milliGRAM(s) IntraMuscular once PRN Glucose LESS THAN 70 milligrams/deciliter      Allergies    sulfa drugs (Unknown)    Intolerances        T(F): 99.1 (19 @ 12:17), Max: 99.1 (19 @ 12:17)  HR: 107 (19 @ 12:17) (79 - 109)  BP: 146/72 (19 @ 12:17) (146/72 - 162/86)  RR: 18 (19 @ 12:17) (18 - 18)  SpO2: 98% (19 @ 12:17) (96% - 99%)  Wt(kg): --    PHYSICAL EXAM:  GENERAL: NAD, well-groomed, well-developed  HEAD:  Atraumatic, Normocephalic  EYES: EOMI, PERRLA, conjunctiva and sclera clear  ENMT: Moist mucous membranes  NECK: Supple, No JVD, Normal thyroid  NERVOUS SYSTEM:  Alert & Oriented X3, Good concentration; Motor Strength 5/5 B/L upper and lower extremities;   CHEST/LUNG: decreased BS bilaterally;   HEART: Regular rate and rhythm; No murmurs, rubs, or gallops  ABDOMEN: Soft, Nontender, Nondistended; Bowel sounds present  EXTREMITIES:  2+ Peripheral Pulses, No clubbing, cyanosis, or edema BL LE  SKIN: No rashes or lesions    LABS:                        11.   12.35 )-----------( 353      ( 2019 06:24 )             34.6         136  |  102  |  23  ----------------------------<  316<H>  4.7   |  28  |  1.22    Ca    9.6      2019 06:24    TPro  7.9  /  Alb  3.1<L>  /  TBili  0.3  /  DBili  x   /  AST  17  /  ALT  34  /  AlkPhos  148<H>      PT/INR - ( 2019 20:23 )   PT: 13.6 sec;   INR: 1.21 ratio         PTT - ( 2019 20:23 )  PTT:24.6 sec  Urinalysis Basic - ( 10 Nov 2019 00:15 )    Color: Yellow / Appearance: Clear / S.005 / pH: x  Gluc: x / Ketone: Negative  / Bili: Negative / Urobili: Negative mg/dL   Blood: x / Protein: 15 mg/dL / Nitrite: Negative   Leuk Esterase: Trace / RBC: 0-2 /HPF / WBC 3-5   Sq Epi: x / Non Sq Epi: Few / Bacteria: Occasional      Cultures;   CAPILLARY BLOOD GLUCOSE      POCT Blood Glucose.: 299 mg/dL (2019 12:07)  POCT Blood Glucose.: 411 mg/dL (2019 07:36)  POCT Blood Glucose.: 411 mg/dL (2019 07:34)  POCT Blood Glucose.: 371 mg/dL (10 Nov 2019 22:01)    Lipid panel:     CARDIAC MARKERS ( 2019 20:23 )  <.015 ng/mL / x     / x     / x     / x            RADIOLOGY & ADDITIONAL TESTS:    Imaging Personally Reviewed:  [x ] YES    < from: CT Angio Chest w/ IV Cont (19 @ 23:01) >  No central pulmonary embolism.    Large subcarinal mass with bihilar lymph nodes.    Obstructive pneumonitis versus pneumonia in the right middle and lower   lobes.    Left hepatic mass. Cannot exclude malignancy (primary or metastatic).   Further characterization is recommended.    < from: CT Angio Chest w/ IV Cont (19 @ 23:01) >  No central pulmonary embolism.    Large subcarinal mass with bihilar lymph nodes.    Obstructive pneumonitis versus pneumonia in the right middle and lower   lobes.    Left hepatic mass. Cannot exclude malignancy (primary or metastatic).   Further characterization is recommended.    Consultant(s) Notes Reviewed:  [x ] YES     Care Discussed with [x ] Consultants [X ] Patient [x ] Family  [x ]    [x ]  Other; RN

## 2019-11-11 NOTE — PROGRESS NOTE ADULT - PROBLEM SELECTOR PLAN 4
- follow up with oncologist at [facility] upon discharge.  - She is scheduled for bronchoscopy next Tuesday.

## 2019-11-11 NOTE — PROGRESS NOTE ADULT - SUBJECTIVE AND OBJECTIVE BOX
78 y/o lady with PMH of HTN, HLD, DM2, Thyroid ca s/p surgeryx3  20+yrs ago, R lung ca s/p resection at [facility], was there for f/u in July and found to have a possible new mass on  and is scheduled for Bronch on coming tuesday.  Pt reports over the last 2-3 days she has been noticing wheezing and gets a bout of cough and along with that sob.  she is not bringing anything up with cough. States today she had a bout of coughing and couldnt catch her breat after and son called ems.  she had denied any fevers at home, but febrile in ed.  pt denies any sick contacts or travels, no cp, palpitations, n/v/d/c.  she had seen pmd few days ago for this and was started on inhalers and states felt a bit better but when gets cough gets the sob. (2019 23:56)      Allergies    sulfa drugs (Unknown)    Intolerances        MEDICATIONS  (STANDING):  ALBUTerol    90 MICROgram(s) HFA Inhaler 1 Puff(s) Inhalation every 4 hours  albuterol/ipratropium for Nebulization 3 milliLiter(s) Nebulizer every 6 hours  azithromycin  IVPB 500 milliGRAM(s) IV Intermittent every 24 hours  dextrose 5%. 1000 milliLiter(s) (50 mL/Hr) IV Continuous <Continuous>  dextrose 50% Injectable 12.5 Gram(s) IV Push once  dextrose 50% Injectable 25 Gram(s) IV Push once  dextrose 50% Injectable 25 Gram(s) IV Push once  gabapentin 300 milliGRAM(s) Oral two times a day  insulin lispro (HumaLOG) corrective regimen sliding scale   SubCutaneous three times a day before meals  insulin lispro (HumaLOG) corrective regimen sliding scale   SubCutaneous at bedtime  letrozole 2.5 milliGRAM(s) Oral daily  levothyroxine 137 MICROGram(s) Oral daily  losartan 100 milliGRAM(s) Oral daily  methylPREDNISolone sodium succinate Injectable 20 milliGRAM(s) IV Push every 8 hours  pantoprazole    Tablet 40 milliGRAM(s) Oral before breakfast  piperacillin/tazobactam IVPB.. 3.375 Gram(s) IV Intermittent every 8 hours  tiotropium 18 MICROgram(s) Capsule 1 Capsule(s) Inhalation daily      REVIEW OF SYSTEMS: IN PROGRESS     CONSTITUTIONAL: No fever, chills, weight loss, or fatigue  HEENT: No sore throat, runny nose, ear ache  RESPIRATORY: No cough, wheezing, No shortness of breath  CARDIOVASCULAR: No chest pain, palpitations, dizziness  GASTROINTESTINAL: No abdominal pain. No nausea, vomiting, diarrhea  GENITOURINARY: No dysuria, increase frequency, hematuria, or incontinence  NEUROLOGICAL: No headaches, memory loss, loss of strength, numbness, or tremors, no weakness  EXTREMITY: No pedal edema BLE  SKIN: No itching, burning, rashes, or lesions     VITAL SIGNS:  T(C): 36.4 (19 @ 05:10), Max: 36.8 (11-10-19 @ 10:42)  T(F): 97.6 (19 @ 05:10), Max: 98.3 (19 @ 00:33)  HR: 98 (19 @ 05:10) (79 - 116)  BP: 152/85 (19 @ 05:10) (145/66 - 162/86)  RR: 18 (19 @ 05:10) (18 - 18)  SpO2: 98% (19 @ 05:10) (93% - 99%)  Wt(kg): --    PHYSICAL EXAM: IN PROGRESS    GENERAL: not in any distress  HEENT: Neck is supple, normocephalic, atraumatic   CHEST/LUNG: Clear to percussion bilaterally; No rales, rhonchi, wheezing  HEART: Regular rate and rhythm; No murmurs, rubs, or gallops  ABDOMEN: Soft, Nontender, Nondistended; Bowel sounds present, no rebound   EXTREMITIES:  2+ Peripheral Pulses, No clubbing, cyanosis, or edema  GENITOURINARY:   SKIN: No rashes or lesions  BACK: no pressor sore   NERVOUS SYSTEM:  Alert & Oriented     LABS:                         11.1   12.35 )-----------( 353      ( 2019 06:24 )             34.6         136  |  102  |  23  ----------------------------<  316<H>  4.7   |  28  |  1.22    Ca    9.6      2019 06:24    TPro  7.9  /  Alb  3.1<L>  /  TBili  0.3  /  DBili  x   /  AST  17  /  ALT  34  /  AlkPhos  148<H>  11-11    LIVER FUNCTIONS - ( 2019 06:24 )  Alb: 3.1 g/dL / Pro: 7.9 gm/dL / ALK PHOS: 148 U/L / ALT: 34 U/L / AST: 17 U/L / GGT: x           PT/INR - ( 2019 20:23 )   PT: 13.6 sec;   INR: 1.21 ratio         PTT - ( 2019 20:23 )  PTT:24.6 sec  Urinalysis Basic - ( 10 Nov 2019 00:15 )    Color: Yellow / Appearance: Clear / S.005 / pH: x  Gluc: x / Ketone: Negative  / Bili: Negative / Urobili: Negative mg/dL   Blood: x / Protein: 15 mg/dL / Nitrite: Negative   Leuk Esterase: Trace / RBC: 0-2 /HPF / WBC 3-5   Sq Epi: x / Non Sq Epi: Few / Bacteria: Occasional        CARDIAC MARKERS ( 2019 20:23 )  <.015 ng/mL / x     / x     / x     / x                        Culture Results:   <10,000 CFU/mL Normal Urogenital Iram (11-10 @ 11:08)  Culture Results:   No growth to date. (11-10 @ 00:46)  Culture Results:   No growth to date. (11-10 @ 00:46)                Radiology: 80 y/o lady with PMH of HTN, HLD, DM2, Thyroid ca s/p surgeryx3  20+yrs ago, R lung ca s/p resection at [facility], was there for f/u in July and found to have a possible new mass on  and is scheduled for Bronch on coming tuesday.  Pt reports over the last 2-3 days she has been noticing wheezing and gets a bout of cough and along with that sob.  she is not bringing anything up with cough. States today she had a bout of coughing and couldnt catch her breat after and son called ems.  she had denied any fevers at home, but febrile in ed.  pt denies any sick contacts or travels, no cp, palpitations, n/v/d/c.  she had seen pmd few days ago for this and was started on inhalers and states felt a bit better but when gets cough gets the sob. (2019 23:56)      Allergies    sulfa drugs (Unknown)    Intolerances        MEDICATIONS  (STANDING):  ALBUTerol    90 MICROgram(s) HFA Inhaler 1 Puff(s) Inhalation every 4 hours  albuterol/ipratropium for Nebulization 3 milliLiter(s) Nebulizer every 6 hours  azithromycin  IVPB 500 milliGRAM(s) IV Intermittent every 24 hours  dextrose 5%. 1000 milliLiter(s) (50 mL/Hr) IV Continuous <Continuous>  dextrose 50% Injectable 12.5 Gram(s) IV Push once  dextrose 50% Injectable 25 Gram(s) IV Push once  dextrose 50% Injectable 25 Gram(s) IV Push once  gabapentin 300 milliGRAM(s) Oral two times a day  insulin lispro (HumaLOG) corrective regimen sliding scale   SubCutaneous three times a day before meals  insulin lispro (HumaLOG) corrective regimen sliding scale   SubCutaneous at bedtime  letrozole 2.5 milliGRAM(s) Oral daily  levothyroxine 137 MICROGram(s) Oral daily  losartan 100 milliGRAM(s) Oral daily  methylPREDNISolone sodium succinate Injectable 20 milliGRAM(s) IV Push every 8 hours  pantoprazole    Tablet 40 milliGRAM(s) Oral before breakfast  piperacillin/tazobactam IVPB.. 3.375 Gram(s) IV Intermittent every 8 hours  tiotropium 18 MICROgram(s) Capsule 1 Capsule(s) Inhalation daily      REVIEW OF SYSTEMS:   CONSTITUTIONAL: No fever, chills, weight loss, or fatigue  HEENT: No sore throat, runny nose, ear ache  RESPIRATORY: No cough, wheezing, No shortness of breath  CARDIOVASCULAR: No chest pain, palpitations, dizziness  GASTROINTESTINAL: No abdominal pain. No nausea, vomiting, diarrhea  GENITOURINARY: No dysuria, increase frequency, hematuria, or incontinence  NEUROLOGICAL: No headaches, memory loss, loss of strength, numbness, or tremors, no weakness  EXTREMITY: No pedal edema BLE  SKIN: No itching, burning, rashes, or lesions     VITAL SIGNS:  T(C): 36.4 (19 @ 05:10), Max: 36.8 (11-10-19 @ 10:42)  T(F): 97.6 (19 @ 05:10), Max: 98.3 (19 @ 00:33)  HR: 98 (19 @ 05:10) (79 - 116)  BP: 152/85 (19 @ 05:10) (145/66 - 162/86)  RR: 18 (19 @ 05:10) (18 - 18)  SpO2: 98% (19 @ 05:10) (93% - 99%)  Wt(kg): --    PHYSICAL EXAM:     GENERAL: not in any distress  HEENT: Neck is supple, normocephalic, atraumatic   CHEST/LUNG: Clear to percussion bilaterally; No rales, rhonchi, wheezing  HEART: Regular rate and rhythm; No murmurs, rubs, or gallops  ABDOMEN: Soft, Nontender, Nondistended; Bowel sounds present, no rebound   EXTREMITIES:  2+ Peripheral Pulses, No clubbing, cyanosis, or edema  GENITOURINARY:   SKIN: No rashes or lesions  BACK: no pressor sore   NERVOUS SYSTEM:  Alert & Oriented     LABS:                         11.1   12.35 )-----------( 353      ( 2019 06:24 )             34.6         136  |  102  |  23  ----------------------------<  316<H>  4.7   |  28  |  1.22    Ca    9.6      2019 06:24    TPro  7.9  /  Alb  3.1<L>  /  TBili  0.3  /  DBili  x   /  AST  17  /  ALT  34  /  AlkPhos  148<H>  11-11    LIVER FUNCTIONS - ( 2019 06:24 )  Alb: 3.1 g/dL / Pro: 7.9 gm/dL / ALK PHOS: 148 U/L / ALT: 34 U/L / AST: 17 U/L / GGT: x           PT/INR - ( 2019 20:23 )   PT: 13.6 sec;   INR: 1.21 ratio         PTT - ( 2019 20:23 )  PTT:24.6 sec  Urinalysis Basic - ( 10 Nov 2019 00:15 )    Color: Yellow / Appearance: Clear / S.005 / pH: x  Gluc: x / Ketone: Negative  / Bili: Negative / Urobili: Negative mg/dL   Blood: x / Protein: 15 mg/dL / Nitrite: Negative   Leuk Esterase: Trace / RBC: 0-2 /HPF / WBC 3-5   Sq Epi: x / Non Sq Epi: Few / Bacteria: Occasional        CARDIAC MARKERS ( 2019 20:23 )  <.015 ng/mL / x     / x     / x     / x                        Culture Results:   <10,000 CFU/mL Normal Urogenital Iram (11-10 @ 11:08)  Culture Results:   No growth to date. (11-10 @ 00:46)  Culture Results:   No growth to date. (11-10 @ 00:46)                Radiology:

## 2019-11-11 NOTE — PROGRESS NOTE ADULT - PROBLEM SELECTOR PLAN 2
- large subcarinal mass, bilateral hilar nodes, left hepatic mass.  - follow up blood cultures  - nebs treatment.  - Patient has known h/o lung cancer for which she follows at [facility] in [place].  - pulmonary and ID following   - She will follow up with her oncologist upon discharge.

## 2019-11-11 NOTE — PROGRESS NOTE ADULT - ASSESSMENT
78 y/o female with  HTN, HLD, DM2, Thyroid ca s/p surgeryx3  20+yrs ago, Hypothyroidism, R lung CA (says thyroid metastatic) s/p Radiation  at [facility] [place] there for f/u in July and found to have a possible new mass on R and is scheduled for Bronch on coming Tuesday. Pt reports over the last 2-3 days she has been noticing wheezing and gets a bout of cough and along w/that sob.  Not bringing anything up with cough. Had a bout of coughing and couldn't catch her breat afterwards, so son called EMS.  Denies any fevers at home, but febrile in ED..  No sick contacts or travels, no cp, palpitations, n/v/d/c.  Seen by  PMD  few days ago for this and was started on inhalers and states felt a bit better but when gets cough gets the sob. Non smoker. admitted w/reactive airway dz and post obstructive pna.

## 2019-11-12 DIAGNOSIS — E03.9 HYPOTHYROIDISM, UNSPECIFIED: ICD-10-CM

## 2019-11-12 DIAGNOSIS — I10 ESSENTIAL (PRIMARY) HYPERTENSION: ICD-10-CM

## 2019-11-12 DIAGNOSIS — E11.65 TYPE 2 DIABETES MELLITUS WITH HYPERGLYCEMIA: ICD-10-CM

## 2019-11-12 LAB
ANION GAP SERPL CALC-SCNC: 8 MMOL/L — SIGNIFICANT CHANGE UP (ref 5–17)
BASE EXCESS BLDA CALC-SCNC: 0 MMOL/L — SIGNIFICANT CHANGE UP (ref -2–2)
BLOOD GAS COMMENTS: SIGNIFICANT CHANGE UP
BLOOD GAS COMMENTS: SIGNIFICANT CHANGE UP
BLOOD GAS SOURCE: SIGNIFICANT CHANGE UP
BUN SERPL-MCNC: 32 MG/DL — HIGH (ref 7–23)
CALCIUM SERPL-MCNC: 9.7 MG/DL — SIGNIFICANT CHANGE UP (ref 8.5–10.1)
CHLORIDE SERPL-SCNC: 101 MMOL/L — SIGNIFICANT CHANGE UP (ref 96–108)
CO2 SERPL-SCNC: 26 MMOL/L — SIGNIFICANT CHANGE UP (ref 22–31)
CREAT SERPL-MCNC: 1.26 MG/DL — SIGNIFICANT CHANGE UP (ref 0.5–1.3)
GLUCOSE BLDC GLUCOMTR-MCNC: 340 MG/DL — HIGH (ref 70–99)
GLUCOSE SERPL-MCNC: 332 MG/DL — HIGH (ref 70–99)
HCO3 BLDA-SCNC: 24 MMOL/L — SIGNIFICANT CHANGE UP (ref 21–29)
HCT VFR BLD CALC: 31.5 % — LOW (ref 34.5–45)
HGB BLD-MCNC: 10.3 G/DL — LOW (ref 11.5–15.5)
HOROWITZ INDEX BLDA+IHG-RTO: 30 — SIGNIFICANT CHANGE UP
MCHC RBC-ENTMCNC: 29.7 PG — SIGNIFICANT CHANGE UP (ref 27–34)
MCHC RBC-ENTMCNC: 32.7 GM/DL — SIGNIFICANT CHANGE UP (ref 32–36)
MCV RBC AUTO: 90.8 FL — SIGNIFICANT CHANGE UP (ref 80–100)
NRBC # BLD: 0 /100 WBCS — SIGNIFICANT CHANGE UP (ref 0–0)
PCO2 BLDA: 41 MMHG — SIGNIFICANT CHANGE UP (ref 32–46)
PH BLD: 7.39 — SIGNIFICANT CHANGE UP (ref 7.35–7.45)
PLATELET # BLD AUTO: 326 K/UL — SIGNIFICANT CHANGE UP (ref 150–400)
PO2 BLDA: 82 MMHG — SIGNIFICANT CHANGE UP (ref 74–108)
POTASSIUM SERPL-MCNC: 4.5 MMOL/L — SIGNIFICANT CHANGE UP (ref 3.5–5.3)
POTASSIUM SERPL-SCNC: 4.5 MMOL/L — SIGNIFICANT CHANGE UP (ref 3.5–5.3)
RBC # BLD: 3.47 M/UL — LOW (ref 3.8–5.2)
RBC # FLD: 12.8 % — SIGNIFICANT CHANGE UP (ref 10.3–14.5)
SAO2 % BLDA: 95 % — SIGNIFICANT CHANGE UP (ref 92–96)
SODIUM SERPL-SCNC: 135 MMOL/L — SIGNIFICANT CHANGE UP (ref 135–145)
WBC # BLD: 11.75 K/UL — HIGH (ref 3.8–10.5)
WBC # FLD AUTO: 11.75 K/UL — HIGH (ref 3.8–10.5)

## 2019-11-12 PROCEDURE — 99233 SBSQ HOSP IP/OBS HIGH 50: CPT

## 2019-11-12 RX ORDER — ACETYLCYSTEINE 200 MG/ML
2 VIAL (ML) MISCELLANEOUS THREE TIMES A DAY
Refills: 0 | Status: COMPLETED | OUTPATIENT
Start: 2019-11-12 | End: 2019-11-14

## 2019-11-12 RX ORDER — MAGNESIUM SULFATE 500 MG/ML
1 VIAL (ML) INJECTION ONCE
Refills: 0 | Status: COMPLETED | OUTPATIENT
Start: 2019-11-12 | End: 2019-11-12

## 2019-11-12 RX ORDER — IPRATROPIUM/ALBUTEROL SULFATE 18-103MCG
3 AEROSOL WITH ADAPTER (GRAM) INHALATION EVERY 8 HOURS
Refills: 0 | Status: DISCONTINUED | OUTPATIENT
Start: 2019-11-12 | End: 2019-11-18

## 2019-11-12 RX ORDER — ENOXAPARIN SODIUM 100 MG/ML
40 INJECTION SUBCUTANEOUS DAILY
Refills: 0 | Status: DISCONTINUED | OUTPATIENT
Start: 2019-11-12 | End: 2019-11-18

## 2019-11-12 RX ADMIN — Medication 3 MILLILITER(S): at 11:28

## 2019-11-12 RX ADMIN — Medication 8: at 09:10

## 2019-11-12 RX ADMIN — GABAPENTIN 300 MILLIGRAM(S): 400 CAPSULE ORAL at 05:22

## 2019-11-12 RX ADMIN — Medication 20 MILLIGRAM(S): at 05:23

## 2019-11-12 RX ADMIN — Medication 3 MILLILITER(S): at 05:25

## 2019-11-12 RX ADMIN — PIPERACILLIN AND TAZOBACTAM 25 GRAM(S): 4; .5 INJECTION, POWDER, LYOPHILIZED, FOR SOLUTION INTRAVENOUS at 23:13

## 2019-11-12 RX ADMIN — LOSARTAN POTASSIUM 100 MILLIGRAM(S): 100 TABLET, FILM COATED ORAL at 05:22

## 2019-11-12 RX ADMIN — PANTOPRAZOLE SODIUM 40 MILLIGRAM(S): 20 TABLET, DELAYED RELEASE ORAL at 15:30

## 2019-11-12 RX ADMIN — Medication 137 MICROGRAM(S): at 05:22

## 2019-11-12 RX ADMIN — Medication 100 GRAM(S): at 10:58

## 2019-11-12 RX ADMIN — Medication 8: at 17:40

## 2019-11-12 RX ADMIN — GABAPENTIN 300 MILLIGRAM(S): 400 CAPSULE ORAL at 17:40

## 2019-11-12 RX ADMIN — Medication 40 MILLIGRAM(S): at 15:30

## 2019-11-12 RX ADMIN — PIPERACILLIN AND TAZOBACTAM 25 GRAM(S): 4; .5 INJECTION, POWDER, LYOPHILIZED, FOR SOLUTION INTRAVENOUS at 05:21

## 2019-11-12 RX ADMIN — Medication 3 MILLILITER(S): at 00:41

## 2019-11-12 RX ADMIN — Medication 3 MILLILITER(S): at 22:05

## 2019-11-12 RX ADMIN — Medication 2 MILLILITER(S): at 22:04

## 2019-11-12 RX ADMIN — Medication 125 MILLIGRAM(S): at 11:05

## 2019-11-12 RX ADMIN — Medication 8: at 23:02

## 2019-11-12 RX ADMIN — Medication 40 MILLIGRAM(S): at 23:03

## 2019-11-12 RX ADMIN — Medication 12: at 12:40

## 2019-11-12 RX ADMIN — PIPERACILLIN AND TAZOBACTAM 25 GRAM(S): 4; .5 INJECTION, POWDER, LYOPHILIZED, FOR SOLUTION INTRAVENOUS at 15:30

## 2019-11-12 RX ADMIN — ENOXAPARIN SODIUM 40 MILLIGRAM(S): 100 INJECTION SUBCUTANEOUS at 15:29

## 2019-11-12 RX ADMIN — LETROZOLE 2.5 MILLIGRAM(S): 2.5 TABLET, FILM COATED ORAL at 17:39

## 2019-11-12 RX ADMIN — Medication 3 MILLILITER(S): at 17:02

## 2019-11-12 RX ADMIN — AZITHROMYCIN 255 MILLIGRAM(S): 500 TABLET, FILM COATED ORAL at 09:42

## 2019-11-12 NOTE — PROGRESS NOTE ADULT - ASSESSMENT
R lung ca s/p resection /  thyroid metastatic s/p Radiation at [facility] present with SOB , likely obstructive pneumonia    on zosyn and zithromax for post obs pneumonia   FU pna work up  fu cultures

## 2019-11-12 NOTE — PROGRESS NOTE ADULT - SUBJECTIVE AND OBJECTIVE BOX
Patient is a 79y old  Female who presents with a chief complaint of sob (11 Nov 2019 16:40)      INTERVAL HPI/OVERNIGHT EVENTS:    MEDICATIONS  (STANDING):  ALBUTerol    90 MICROgram(s) HFA Inhaler 1 Puff(s) Inhalation every 4 hours  albuterol/ipratropium for Nebulization 3 milliLiter(s) Nebulizer every 6 hours  azithromycin  IVPB 500 milliGRAM(s) IV Intermittent every 24 hours  dextrose 5%. 1000 milliLiter(s) (50 mL/Hr) IV Continuous <Continuous>  dextrose 50% Injectable 12.5 Gram(s) IV Push once  dextrose 50% Injectable 25 Gram(s) IV Push once  dextrose 50% Injectable 25 Gram(s) IV Push once  gabapentin 300 milliGRAM(s) Oral two times a day  insulin lispro (HumaLOG) corrective regimen sliding scale   SubCutaneous three times a day before meals  insulin lispro (HumaLOG) corrective regimen sliding scale   SubCutaneous at bedtime  letrozole 2.5 milliGRAM(s) Oral daily  levothyroxine 137 MICROGram(s) Oral daily  losartan 100 milliGRAM(s) Oral daily  methylPREDNISolone sodium succinate Injectable 20 milliGRAM(s) IV Push every 8 hours  pantoprazole    Tablet 40 milliGRAM(s) Oral before breakfast  piperacillin/tazobactam IVPB.. 3.375 Gram(s) IV Intermittent every 8 hours  tiotropium 18 MICROgram(s) Capsule 1 Capsule(s) Inhalation daily    MEDICATIONS  (PRN):  dextrose 40% Gel 15 Gram(s) Oral once PRN Blood Glucose LESS THAN 70 milliGRAM(s)/deciliter  glucagon  Injectable 1 milliGRAM(s) IntraMuscular once PRN Glucose LESS THAN 70 milligrams/deciliter      Allergies    sulfa drugs (Unknown)    Intolerances        Vital Signs Last 24 Hrs  T(C): 36.8 (12 Nov 2019 05:28), Max: 37.3 (11 Nov 2019 12:17)  T(F): 98.2 (12 Nov 2019 05:28), Max: 99.1 (11 Nov 2019 12:17)  HR: 100 (12 Nov 2019 06:41) (94 - 107)  BP: 154/77 (12 Nov 2019 05:28) (143/92 - 154/77)  BP(mean): --  RR: 17 (12 Nov 2019 05:28) (16 - 18)  SpO2: 98% (12 Nov 2019 06:41) (97% - 98%)    PHYSICAL EXAM:  GENERAL: NAD, well-groomed, well-developed  HEAD:  Atraumatic, Normocephalic  EYES: EOMI, PERRLA, conjunctiva and sclera clear  ENMT: No tonsillar erythema, exudates, or enlargement; Moist mucous membranes, Good dentition, No lesions  NECK: Supple, No JVD, Normal thyroid  NERVOUS SYSTEM:  Alert & Oriented X3, Good concentration; Motor Strength 5/5 B/L upper and lower extremities; DTRs 2+ intact and symmetric  CHEST/LUNG: Clear to auscultation bilaterally; No rales, rhonchi, wheezing, or rubs  HEART: Regular rate and rhythm; No murmurs, rubs, or gallops  ABDOMEN: Soft, Nontender, Nondistended; Bowel sounds present  EXTREMITIES:  2+ Peripheral Pulses, No clubbing, cyanosis, or edema  LYMPH: No lymphadenopathy noted  SKIN: No rashes or lesions    LABS:                        10.3   11.75 )-----------( 326      ( 12 Nov 2019 06:38 )             31.5     11-12    135  |  101  |  32<H>  ----------------------------<  332<H>  4.5   |  26  |  1.26    Ca    9.7      12 Nov 2019 06:38    TPro  7.9  /  Alb  3.1<L>  /  TBili  0.3  /  DBili  x   /  AST  17  /  ALT  34  /  AlkPhos  148<H>  11-11        CAPILLARY BLOOD GLUCOSE      POCT Blood Glucose.: 340 mg/dL (12 Nov 2019 08:44)  POCT Blood Glucose.: 327 mg/dL (11 Nov 2019 21:08)  POCT Blood Glucose.: 354 mg/dL (11 Nov 2019 17:15)  POCT Blood Glucose.: 299 mg/dL (11 Nov 2019 12:07)      Culture - Urine (collected 10 Nov 2019 11:08)  Source: .Urine Clean Catch (Midstream)  Final Report (11 Nov 2019 07:47):    <10,000 CFU/mL Normal Urogenital Iram    Culture - Blood (collected 10 Nov 2019 00:46)  Source: .Blood Blood  Preliminary Report (11 Nov 2019 01:07):    No growth to date.    Culture - Blood (collected 10 Nov 2019 00:46)  Source: .Blood Blood  Preliminary Report (11 Nov 2019 01:07):    No growth to date.      RADIOLOGY & ADDITIONAL TESTS:    11-11-19 @ 07:01  -  11-12-19 @ 07:00  --------------------------------------------------------  IN:  Total IN: 0 mL    OUT:    Voided: 700 mL  Total OUT: 700 mL    Total NET: -700 mL 80 y/o female with history of HTN, HLD, DM2, likely CKD III, Hypothyroidism & Thyroid ca s/p surgeryx3 20+yrs ago & mets to R lung status post Radiation at [facility] [place] was there for f/u in July and found to have a possible new mass on R and is scheduled for Bronch on coming Tuesday who p/w  post obstructive PNA. She had an episode of respiratory distress this morning requiring BiPAP.    MEDICATIONS  (STANDING):  ALBUTerol    90 MICROgram(s) HFA Inhaler 1 Puff(s) Inhalation every 4 hours  albuterol/ipratropium for Nebulization 3 milliLiter(s) Nebulizer every 6 hours  azithromycin  IVPB 500 milliGRAM(s) IV Intermittent every 24 hours  dextrose 5%. 1000 milliLiter(s) (50 mL/Hr) IV Continuous <Continuous>  dextrose 50% Injectable 12.5 Gram(s) IV Push once  dextrose 50% Injectable 25 Gram(s) IV Push once  dextrose 50% Injectable 25 Gram(s) IV Push once  gabapentin 300 milliGRAM(s) Oral two times a day  insulin lispro (HumaLOG) corrective regimen sliding scale   SubCutaneous three times a day before meals  insulin lispro (HumaLOG) corrective regimen sliding scale   SubCutaneous at bedtime  letrozole 2.5 milliGRAM(s) Oral daily  levothyroxine 137 MICROGram(s) Oral daily  losartan 100 milliGRAM(s) Oral daily  methylPREDNISolone sodium succinate Injectable 20 milliGRAM(s) IV Push every 8 hours  pantoprazole    Tablet 40 milliGRAM(s) Oral before breakfast  piperacillin/tazobactam IVPB.. 3.375 Gram(s) IV Intermittent every 8 hours  tiotropium 18 MICROgram(s) Capsule 1 Capsule(s) Inhalation daily    MEDICATIONS  (PRN):  dextrose 40% Gel 15 Gram(s) Oral once PRN Blood Glucose LESS THAN 70 milliGRAM(s)/deciliter  glucagon  Injectable 1 milliGRAM(s) IntraMuscular once PRN Glucose LESS THAN 70 milligrams/deciliter      Allergies    sulfa drugs (Unknown)    Intolerances        Vital Signs Last 24 Hrs  T(C): 36.8 (12 Nov 2019 05:28), Max: 37.3 (11 Nov 2019 12:17)  T(F): 98.2 (12 Nov 2019 05:28), Max: 99.1 (11 Nov 2019 12:17)  HR: 100 (12 Nov 2019 06:41) (94 - 107)  BP: 154/77 (12 Nov 2019 05:28) (143/92 - 154/77)  BP(mean): --  RR: 17 (12 Nov 2019 05:28) (16 - 18)  SpO2: 98% (12 Nov 2019 06:41) (97% - 98%)    PHYSICAL EXAM:  GENERAL: NAD, well-groomed, well-developed  HEAD:  Atraumatic, Normocephalic  EYES: EOMI, PERRLA, conjunctiva and sclera clear  ENMT: No tonsillar erythema, exudates, or enlargement; Moist mucous membranes, Good dentition, No lesions  NECK: Supple, No JVD, Normal thyroid  NERVOUS SYSTEM:  Alert & Oriented X3, Good concentration; Motor Strength 5/5 B/L upper and lower extremities; DTRs 2+ intact and symmetric  CHEST/LUNG: Clear to auscultation bilaterally; No rales, rhonchi, wheezing, or rubs  HEART: Regular rate and rhythm; No murmurs, rubs, or gallops  ABDOMEN: Soft, Nontender, Nondistended; Bowel sounds present  EXTREMITIES:  2+ Peripheral Pulses, No clubbing, cyanosis, or edema  LYMPH: No lymphadenopathy noted  SKIN: No rashes or lesions    LABS:                        10.3   11.75 )-----------( 326      ( 12 Nov 2019 06:38 )             31.5     11-12    135  |  101  |  32<H>  ----------------------------<  332<H>  4.5   |  26  |  1.26    Ca    9.7      12 Nov 2019 06:38    TPro  7.9  /  Alb  3.1<L>  /  TBili  0.3  /  DBili  x   /  AST  17  /  ALT  34  /  AlkPhos  148<H>  11-11        CAPILLARY BLOOD GLUCOSE      POCT Blood Glucose.: 340 mg/dL (12 Nov 2019 08:44)  POCT Blood Glucose.: 327 mg/dL (11 Nov 2019 21:08)  POCT Blood Glucose.: 354 mg/dL (11 Nov 2019 17:15)  POCT Blood Glucose.: 299 mg/dL (11 Nov 2019 12:07)      Culture - Urine (collected 10 Nov 2019 11:08)  Source: .Urine Clean Catch (Midstream)  Final Report (11 Nov 2019 07:47):    <10,000 CFU/mL Normal Urogenital Iram    Culture - Blood (collected 10 Nov 2019 00:46)  Source: .Blood Blood  Preliminary Report (11 Nov 2019 01:07):    No growth to date.    Culture - Blood (collected 10 Nov 2019 00:46)  Source: .Blood Blood  Preliminary Report (11 Nov 2019 01:07):    No growth to date.      RADIOLOGY & ADDITIONAL TESTS:    11-11-19 @ 07:01  -  11-12-19 @ 07:00  --------------------------------------------------------  IN:  Total IN: 0 mL    OUT:    Voided: 700 mL  Total OUT: 700 mL    Total NET: -700 mL

## 2019-11-12 NOTE — CHART NOTE - NSCHARTNOTEFT_GEN_A_CORE
RRT Note:    RRT called by nurse for respiratory distress.  Patient found to have respiratory distress w/ expiratory wheezing.        HPI:  Pt is a 78 y/o female w/pmhx of HTN, HLD, DM2, Thyroid ca s/p surgeryx3  20+yrs ago, R lung ca s/p resection at [facility], was there for f/u in July and found to have a possible new mass on R and is scheduled for Bronch on coming tuesday.  Pt reports over the last 2-3 days she has been noticing wheezing and gets a bout of cough and along w/that sob.  she is not bringing anything up with cough. States today she had a bout of coughing and couldnt catch her breat after and son called ems.  she had denied any fevers at home, but febrile in ed.  pt denies any sick contacts or travels, no cp, palpitations, n/v/d/c.  she had seen pmd few days ago for this and was started on inhalers and states felt a bit better but when gets cough gets the sob. (09 Nov 2019 23:56)      ALBUTerol    90 MICROgram(s) HFA Inhaler 1 Puff(s) Inhalation every 4 hours  albuterol/ipratropium for Nebulization 3 milliLiter(s) Nebulizer every 6 hours  azithromycin  IVPB 500 milliGRAM(s) IV Intermittent every 24 hours  dextrose 40% Gel 15 Gram(s) Oral once PRN  dextrose 5%. 1000 milliLiter(s) IV Continuous <Continuous>  dextrose 50% Injectable 12.5 Gram(s) IV Push once  dextrose 50% Injectable 25 Gram(s) IV Push once  dextrose 50% Injectable 25 Gram(s) IV Push once  enoxaparin Injectable 40 milliGRAM(s) SubCutaneous daily  gabapentin 300 milliGRAM(s) Oral two times a day  glucagon  Injectable 1 milliGRAM(s) IntraMuscular once PRN  insulin lispro (HumaLOG) corrective regimen sliding scale   SubCutaneous three times a day before meals  insulin lispro (HumaLOG) corrective regimen sliding scale   SubCutaneous at bedtime  letrozole 2.5 milliGRAM(s) Oral daily  levothyroxine 137 MICROGram(s) Oral daily  losartan 100 milliGRAM(s) Oral daily  magnesium sulfate  IVPB 1 Gram(s) IV Intermittent once  methylPREDNISolone sodium succinate Injectable 125 milliGRAM(s) IV Push once  methylPREDNISolone sodium succinate Injectable 20 milliGRAM(s) IV Push every 8 hours  pantoprazole    Tablet 40 milliGRAM(s) Oral before breakfast  piperacillin/tazobactam IVPB.. 3.375 Gram(s) IV Intermittent every 8 hours  tiotropium 18 MICROgram(s) Capsule 1 Capsule(s) Inhalation daily      T(F): 98.2 (11-12-19 @ 05:28), Max: 99.1 (11-11-19 @ 12:17)  HR: 127 (11-12-19 @ 11:22) (94 - 127)  BP: 154/77 (11-12-19 @ 05:28) (143/92 - 154/77)  RR: 17 (11-12-19 @ 05:28) (16 - 18)  SpO2: 98% (11-12-19 @ 11:22) (97% - 98%)  Wt(kg): --    PHYSICAL EXAM:  General: NAD, WDWN.   CV: +S1+S2 regular rate and rhythm  Lung: +diffuse wheezing, decreased breath sounds b/l  Abdomen: soft, NTND. Normactive BS  Extremities: no pedal edema or calf tenderness noted     LABS:                        10.3   11.75 )-----------( 326      ( 12 Nov 2019 06:38 )             31.5     11-12    135  |  101  |  32<H>  ----------------------------<  332<H>  4.5   |  26  |  1.26    Ca    9.7      12 Nov 2019 06:38    TPro  7.9  /  Alb  3.1<L>  /  TBili  0.3  /  DBili  x   /  AST  17  /  ALT  34  /  AlkPhos  148<H>  11-11          I&O's Detail    11 Nov 2019 07:01  -  12 Nov 2019 07:00  --------------------------------------------------------  IN:  Total IN: 0 mL    OUT:    Voided: 700 mL  Total OUT: 700 mL    Total NET: -700 mL            Assessment:  HPI:  Pt is a 78 y/o female w/ pmhx of HTN, HLD, DM2, Thyroid ca s/p surgeryx3  20+yrs ago, R lung ca s/p resection at [facility], was there for f/u in July and found to have a possible new mass on R and is scheduled for Bronch on coming tuesday.  Pt reports over the last 2-3 days she has been noticing wheezing and gets a bout of cough and along w/that sob.  she is not bringing anything up with cough. States today she had a bout of coughing and couldn't catch her breat after and son called ems.  she had denied any fevers at home, but febrile in ed.  pt denies any sick contacts or travels, no cp, palpitations, n/v/d/c.  she had seen pmd few days ago for this and was started on inhalers and states felt a bit better but when gets cough gets the sob. (09 Nov 2019 23:56)    1) Respiratory Distress 2ndary PNA/ Asthma Exacerbation  2) Diabetes type 2, uncontrolled  3) Essential hypertension  4) Lung cancer  5) Hypothyroidism      Plan:  - Solu-medrol 125mg x stat  - Duoneb TX x stat  - Magnesium 1gm x stat  - BIPAP x stat  - ABG in 30 minutes  - will consult ICU if patient is not improving on BiPAP

## 2019-11-12 NOTE — PROGRESS NOTE ADULT - SUBJECTIVE AND OBJECTIVE BOX
HPI:  Pt is a 80 y/o female w/pmhx of HTN, HLD, DM2, Thyroid ca s/p surgeryx3  20+yrs ago, R lung ca s/p resection at [place], was there for f/u in July and found to have a possible new mass on R and is scheduled for Bronch on coming tuesday.  Pt reports over the last 2-3 days she has been noticing wheezing and gets a bout of cough and along w/that sob.  she is not bringing anything up with cough. States today she had a bout of coughing and couldnt catch her breath after and son called ems.  she had denied any fevers at home, but febrile in ed.  pt denies any sick contacts or travels, no cp, palpitations, n/v/d/c.  she had seen pmd few days ago for this and was started on inhalers and states felt a bit better but when gets cough gets the sob. (09 Nov 2019 23:56)  all events noted   feels better but on bipap    Allergies    sulfa drugs (Unknown)    Intolerances        MEDICATIONS  (STANDING):  acetylcysteine 10%  Inhalation 2 milliLiter(s) Inhalation three times a day  ALBUTerol    90 MICROgram(s) HFA Inhaler 1 Puff(s) Inhalation every 4 hours  albuterol/ipratropium for Nebulization. 3 milliLiter(s) Nebulizer every 8 hours  azithromycin  IVPB 500 milliGRAM(s) IV Intermittent every 24 hours  dextrose 5%. 1000 milliLiter(s) (50 mL/Hr) IV Continuous <Continuous>  dextrose 50% Injectable 12.5 Gram(s) IV Push once  dextrose 50% Injectable 25 Gram(s) IV Push once  dextrose 50% Injectable 25 Gram(s) IV Push once  enoxaparin Injectable 40 milliGRAM(s) SubCutaneous daily  gabapentin 300 milliGRAM(s) Oral two times a day  insulin lispro (HumaLOG) corrective regimen sliding scale   SubCutaneous three times a day before meals  insulin lispro (HumaLOG) corrective regimen sliding scale   SubCutaneous at bedtime  letrozole 2.5 milliGRAM(s) Oral daily  levothyroxine 137 MICROGram(s) Oral daily  losartan 100 milliGRAM(s) Oral daily  methylPREDNISolone sodium succinate Injectable 40 milliGRAM(s) IV Push every 8 hours  pantoprazole    Tablet 40 milliGRAM(s) Oral before breakfast  piperacillin/tazobactam IVPB.. 3.375 Gram(s) IV Intermittent every 8 hours  tiotropium 18 MICROgram(s) Capsule 1 Capsule(s) Inhalation daily    MEDICATIONS  (PRN):  dextrose 40% Gel 15 Gram(s) Oral once PRN Blood Glucose LESS THAN 70 milliGRAM(s)/deciliter  glucagon  Injectable 1 milliGRAM(s) IntraMuscular once PRN Glucose LESS THAN 70 milligrams/deciliter      REVIEW OF SYSTEMS:    difficult to obtain   on bipap  denies pain nausea vomiting diarrhea  feels fine     VITAL SIGNS:  T(C): 36.4 (11-12-19 @ 17:35), Max: 36.9 (11-12-19 @ 00:14)  T(F): 97.5 (11-12-19 @ 17:35), Max: 98.5 (11-12-19 @ 00:14)  HR: 120 (11-12-19 @ 23:19) (100 - 152)  BP: 147/80 (11-12-19 @ 17:35) (143/92 - 154/77)  RR: 18 (11-12-19 @ 17:35) (16 - 24)  SpO2: 98% (11-12-19 @ 18:03) (90% - 98%)  Wt(kg): --    PHYSICAL EXAM:    GENERAL: on bipap  HEENT: Neck is supple, normocephalic, atraumatic   CHEST/LUNG: Clear to auscultation bilaterally; bilateral , rhonchi  no, wheezing;dec breath sounds   HEART: Regular rate and rhythm; No murmurs, rubs, or gallops  ABDOMEN: Soft, Nontender, Nondistended; Bowel sounds present, no rebound   EXTREMITIES:  2+ Peripheral Pulses, No clubbing, cyanosis, or edema  SKIN: No rashes or lesions  BACK: no pressor sore   NERVOUS SYSTEM:  Alert & Oriented X3,  LABS:                         10.3   11.75 )-----------( 326      ( 12 Nov 2019 06:38 )             31.5     11-12    135  |  101  |  32<H>  ----------------------------<  332<H>  4.5   |  26  |  1.26    Ca    9.7      12 Nov 2019 06:38    TPro  7.9  /  Alb  3.1<L>  /  TBili  0.3  /  DBili  x   /  AST  17  /  ALT  34  /  AlkPhos  148<H>  11-11    LIVER FUNCTIONS - ( 11 Nov 2019 06:24 )  Alb: 3.1 g/dL / Pro: 7.9 gm/dL / ALK PHOS: 148 U/L / ALT: 34 U/L / AST: 17 U/L / GGT: x               ABG - ( 12 Nov 2019 12:02 )  pH, Arterial: x     pH, Blood: 7.39  /  pCO2: 41    /  pO2: 82    / HCO3: 24    / Base Excess: 0.0   /  SaO2: 95                                  Culture Results:   <10,000 CFU/mL Normal Urogenital Iram (11-10 @ 11:08)  Culture Results:   No growth to date. (11-10 @ 00:46)  Culture Results:   No growth to date. (11-10 @ 00:46)                Radiology:  < from: CT Angio Chest w/ IV Cont (11.09.19 @ 23:01) >  IMPRESSION:     No central pulmonary embolism.    Large subcarinal mass with bihilar lymph nodes.    Obstructive pneumonitis versus pneumonia in the right middle and lower   lobes.    Left hepatic mass. Cannot exclude malignancy (primary or metastatic).   Further characterization is recommended.                MAYO MISTRY M.D., ATTENDING RADIOLOGIST  This document has been electronically signed. Nov 9 2019 11:24PM                < end of copied text >

## 2019-11-12 NOTE — PROGRESS NOTE ADULT - ASSESSMENT
80 y/o female with history of HTN, HLD, DM2, likely CKD III, Hypothyroidism & Thyroid ca s/p surgeryx3 20+yrs ago & mets to R lung status post Radiation at [facility] was there for f/u in July and found to have a possible new mass on R and is scheduled for Bronch on coming Tuesday who p/w  post obstructive PNA.     Acute respiratory failure with hypoxia  - due to PNA w/ underlying lung mets  - O2 supplemented, monitor pulse oxymetry   - c/w Duoneb, Spiriva and solumedrol as per pulm    Pneumonia of right middle lobe  - due to other gram-negative/gram-positive/atypical bacteria  - CT chest showed a large subcarinal mass with bihilar lymph nodes, obstructive pneumonitis vs pneumonia in the right middle and lower lobes   - c/w Zosyn and Azithro    - NGTD on blood cultures  - pulmonary and ID following     Postoperative hypothyroidism  - c/w synthroid    Malignant neoplasm of right lung  - CT chest showed a large subcarinal mass with bihilar lymph nodes & a left hepatic mass  - patient will follow up with oncologist at [place] upon discharge w/ plans for bronchoscopy   - c/w Femara     Uncontrolled type 2 diabetes mellitus with hyperglycemia  - c/w ISS  - c/w gabapentin     HTN  - c/w losartan    Prophylaxis:  DVT: add lovenox  GI:  Protonix  PT: home w/ home PT 80 y/o female with history of HTN, HLD, DM2, likely CKD III, Hypothyroidism & Thyroid ca s/p surgeryx3 20+yrs ago & mets to R lung status post Radiation at [facility] was there for f/u in July and found to have a possible new mass on R and is scheduled for Bronch on coming Tuesday who p/w  post obstructive PNA.     Acute respiratory failure with hypoxia  - BiPAP started today  - due to PNA w/ underlying lung mets  - O2 supplemented, monitor pulse oxymetry   - c/w Duoneb, Spiriva and increase solumedrol      Pneumonia of right middle lobe  - due to other gram-negative/gram-positive/atypical bacteria  - CT chest showed a large subcarinal mass with bihilar lymph nodes, obstructive pneumonitis vs pneumonia in the right middle and lower lobes   - c/w Zosyn and Azithro    - NGTD on blood cultures  - pulmonary and ID following     Postoperative hypothyroidism  - c/w synthroid    Malignant neoplasm of right lung  - CT chest showed a large subcarinal mass with bihilar lymph nodes & a left hepatic mass  - patient will follow up with oncologist at [place] upon discharge w/ plans for bronchoscopy   - c/w Femara     Uncontrolled type 2 diabetes mellitus with hyperglycemia  - c/w ISS  - c/w gabapentin     HTN  - c/w losartan    Prophylaxis:  DVT: add Lovenox  GI:  Protonix  PT: home w/ home PT

## 2019-11-12 NOTE — PROGRESS NOTE ADULT - SUBJECTIVE AND OBJECTIVE BOX
INTERVAL HPI:  78 y/o female with  HTN, HLD, DM2, Thyroid ca s/p surgeryx3  20+yrs ago, Hypothyroidism, R lung CA(says thyroid metastatic) s/p Radiation  at [facility] was there for f/u in July and found to have a possible new mass on R and is scheduled for Bronch on coming tuesday.    Pt reports over the last 2-3 days she has been noticing wheezing and gets a bout of cough and along w/that sob.  Not bringing anything up with cough. Had a bout of coughing and couldn't catch her breat afterwards, so son called EMS.  Denies any fevers at home, but febrile in ED..  No sick contacts or travels, no cp, palpitations, n/v/d/c.  Seen by  PMD  few days ago for this and was started on inhalers and states felt a bit better but when gets cough gets the sob.  Non smoker.    OVERNIGHT EVENTS:  Had Respiratory distress, placed on BIPAP. Says can not expectorate.    Vital Signs Last 24 Hrs  T(C): 36.8 (12 Nov 2019 05:28), Max: 36.9 (12 Nov 2019 00:14)  T(F): 98.2 (12 Nov 2019 05:28), Max: 98.5 (12 Nov 2019 00:14)  HR: 127 (12 Nov 2019 11:22) (94 - 152)  BP: 154/77 (12 Nov 2019 05:28) (143/92 - 154/77)  BP(mean): --  RR: 24 (12 Nov 2019 10:40) (16 - 24)  SpO2: 98% (12 Nov 2019 11:22) (90% - 98%)    PHYSICAL EXAM:  GEN:         Awake, responsive and comfortable.  HEENT:     BIPAP.  RESP:       no distress.  CVS:          Regular rate and rhythm.   ABD:         Soft, non-tender, non-distended;     MEDICATIONS  (STANDING):  ALBUTerol    90 MICROgram(s) HFA Inhaler 1 Puff(s) Inhalation every 4 hours  albuterol/ipratropium for Nebulization 3 milliLiter(s) Nebulizer every 6 hours  azithromycin  IVPB 500 milliGRAM(s) IV Intermittent every 24 hours  dextrose 5%. 1000 milliLiter(s) (50 mL/Hr) IV Continuous <Continuous>  dextrose 50% Injectable 12.5 Gram(s) IV Push once  dextrose 50% Injectable 25 Gram(s) IV Push once  dextrose 50% Injectable 25 Gram(s) IV Push once  enoxaparin Injectable 40 milliGRAM(s) SubCutaneous daily  gabapentin 300 milliGRAM(s) Oral two times a day  insulin lispro (HumaLOG) corrective regimen sliding scale   SubCutaneous three times a day before meals  insulin lispro (HumaLOG) corrective regimen sliding scale   SubCutaneous at bedtime  letrozole 2.5 milliGRAM(s) Oral daily  levothyroxine 137 MICROGram(s) Oral daily  losartan 100 milliGRAM(s) Oral daily  methylPREDNISolone sodium succinate Injectable 40 milliGRAM(s) IV Push every 8 hours  pantoprazole    Tablet 40 milliGRAM(s) Oral before breakfast  piperacillin/tazobactam IVPB.. 3.375 Gram(s) IV Intermittent every 8 hours  tiotropium 18 MICROgram(s) Capsule 1 Capsule(s) Inhalation daily    MEDICATIONS  (PRN):  dextrose 40% Gel 15 Gram(s) Oral once PRN Blood Glucose LESS THAN 70 milliGRAM(s)/deciliter  glucagon  Injectable 1 milliGRAM(s) IntraMuscular once PRN Glucose LESS THAN 70 milligrams/deciliter    LABS:                        10.3   11.75 )-----------( 326      ( 12 Nov 2019 06:38 )             31.5     11-12    135  |  101  |  32<H>  ----------------------------<  332<H>  4.5   |  26  |  1.26    Ca    9.7      12 Nov 2019 06:38    TPro  7.9  /  Alb  3.1<L>  /  TBili  0.3  /  DBili  x   /  AST  17  /  ALT  34  /  AlkPhos  148<H>  11-11 11-12 @ 12:02  pH: 7.39  pCO2: 41  pO2: 82  SaO2: 95    ASSESSMENT AND PLAN:  ·	SOB.  ·	RML Pneumonia, likely post obstructive.  ·	Lung Mass.  ·	Hypothyroidism.  ·	Thyroid CA history.  ·	HTN.  ·	DM.  ·	Leukocytosis.  ·	Anemia.    O2 with as needed BIPAP.  Continue antibiotics, nebulizer and Mucomyst.

## 2019-11-13 LAB
ANION GAP SERPL CALC-SCNC: 8 MMOL/L — SIGNIFICANT CHANGE UP (ref 5–17)
BUN SERPL-MCNC: 31 MG/DL — HIGH (ref 7–23)
CALCIUM SERPL-MCNC: 9.8 MG/DL — SIGNIFICANT CHANGE UP (ref 8.5–10.1)
CHLORIDE SERPL-SCNC: 102 MMOL/L — SIGNIFICANT CHANGE UP (ref 96–108)
CO2 SERPL-SCNC: 27 MMOL/L — SIGNIFICANT CHANGE UP (ref 22–31)
CREAT SERPL-MCNC: 1.27 MG/DL — SIGNIFICANT CHANGE UP (ref 0.5–1.3)
GLUCOSE SERPL-MCNC: 352 MG/DL — HIGH (ref 70–99)
HCT VFR BLD CALC: 32 % — LOW (ref 34.5–45)
HGB BLD-MCNC: 10.4 G/DL — LOW (ref 11.5–15.5)
MAGNESIUM SERPL-MCNC: 2.2 MG/DL — SIGNIFICANT CHANGE UP (ref 1.6–2.6)
MCHC RBC-ENTMCNC: 29.3 PG — SIGNIFICANT CHANGE UP (ref 27–34)
MCHC RBC-ENTMCNC: 32.5 GM/DL — SIGNIFICANT CHANGE UP (ref 32–36)
MCV RBC AUTO: 90.1 FL — SIGNIFICANT CHANGE UP (ref 80–100)
NRBC # BLD: 0 /100 WBCS — SIGNIFICANT CHANGE UP (ref 0–0)
PHOSPHATE SERPL-MCNC: 3.1 MG/DL — SIGNIFICANT CHANGE UP (ref 2.5–4.5)
PLATELET # BLD AUTO: 341 K/UL — SIGNIFICANT CHANGE UP (ref 150–400)
POTASSIUM SERPL-MCNC: 4.1 MMOL/L — SIGNIFICANT CHANGE UP (ref 3.5–5.3)
POTASSIUM SERPL-SCNC: 4.1 MMOL/L — SIGNIFICANT CHANGE UP (ref 3.5–5.3)
RBC # BLD: 3.55 M/UL — LOW (ref 3.8–5.2)
RBC # FLD: 12.6 % — SIGNIFICANT CHANGE UP (ref 10.3–14.5)
SODIUM SERPL-SCNC: 137 MMOL/L — SIGNIFICANT CHANGE UP (ref 135–145)
TSH SERPL-MCNC: <0.005 UIU/ML — LOW (ref 0.36–3.74)
WBC # BLD: 9.39 K/UL — SIGNIFICANT CHANGE UP (ref 3.8–10.5)
WBC # FLD AUTO: 9.39 K/UL — SIGNIFICANT CHANGE UP (ref 3.8–10.5)

## 2019-11-13 PROCEDURE — 93010 ELECTROCARDIOGRAM REPORT: CPT

## 2019-11-13 PROCEDURE — 71045 X-RAY EXAM CHEST 1 VIEW: CPT | Mod: 26

## 2019-11-13 PROCEDURE — 99233 SBSQ HOSP IP/OBS HIGH 50: CPT

## 2019-11-13 RX ORDER — LABETALOL HCL 100 MG
10 TABLET ORAL ONCE
Refills: 0 | Status: COMPLETED | OUTPATIENT
Start: 2019-11-13 | End: 2019-11-13

## 2019-11-13 RX ORDER — IPRATROPIUM/ALBUTEROL SULFATE 18-103MCG
3 AEROSOL WITH ADAPTER (GRAM) INHALATION ONCE
Refills: 0 | Status: COMPLETED | OUTPATIENT
Start: 2019-11-13 | End: 2019-11-13

## 2019-11-13 RX ORDER — FUROSEMIDE 40 MG
40 TABLET ORAL ONCE
Refills: 0 | Status: COMPLETED | OUTPATIENT
Start: 2019-11-13 | End: 2019-11-13

## 2019-11-13 RX ORDER — ALBUTEROL 90 UG/1
1 AEROSOL, METERED ORAL EVERY 4 HOURS
Refills: 0 | Status: DISCONTINUED | OUTPATIENT
Start: 2019-11-13 | End: 2019-11-18

## 2019-11-13 RX ADMIN — Medication 40 MILLIGRAM(S): at 21:36

## 2019-11-13 RX ADMIN — Medication 137 MICROGRAM(S): at 05:24

## 2019-11-13 RX ADMIN — Medication 10: at 11:51

## 2019-11-13 RX ADMIN — Medication 3 MILLILITER(S): at 22:10

## 2019-11-13 RX ADMIN — Medication 2 MILLILITER(S): at 13:30

## 2019-11-13 RX ADMIN — Medication 3 MILLILITER(S): at 13:30

## 2019-11-13 RX ADMIN — LOSARTAN POTASSIUM 100 MILLIGRAM(S): 100 TABLET, FILM COATED ORAL at 05:23

## 2019-11-13 RX ADMIN — GABAPENTIN 300 MILLIGRAM(S): 400 CAPSULE ORAL at 05:23

## 2019-11-13 RX ADMIN — Medication 2 MILLILITER(S): at 05:37

## 2019-11-13 RX ADMIN — PIPERACILLIN AND TAZOBACTAM 25 GRAM(S): 4; .5 INJECTION, POWDER, LYOPHILIZED, FOR SOLUTION INTRAVENOUS at 05:24

## 2019-11-13 RX ADMIN — LETROZOLE 2.5 MILLIGRAM(S): 2.5 TABLET, FILM COATED ORAL at 17:12

## 2019-11-13 RX ADMIN — AZITHROMYCIN 255 MILLIGRAM(S): 500 TABLET, FILM COATED ORAL at 05:21

## 2019-11-13 RX ADMIN — Medication 40 MILLIGRAM(S): at 05:22

## 2019-11-13 RX ADMIN — Medication 4: at 21:35

## 2019-11-13 RX ADMIN — Medication 40 MILLIGRAM(S): at 16:51

## 2019-11-13 RX ADMIN — Medication 40 MILLIGRAM(S): at 13:45

## 2019-11-13 RX ADMIN — Medication 10: at 07:57

## 2019-11-13 RX ADMIN — GABAPENTIN 300 MILLIGRAM(S): 400 CAPSULE ORAL at 17:13

## 2019-11-13 RX ADMIN — Medication 3 MILLILITER(S): at 05:37

## 2019-11-13 RX ADMIN — PANTOPRAZOLE SODIUM 40 MILLIGRAM(S): 20 TABLET, DELAYED RELEASE ORAL at 05:24

## 2019-11-13 RX ADMIN — ALBUTEROL 1 PUFF(S): 90 AEROSOL, METERED ORAL at 21:38

## 2019-11-13 RX ADMIN — PIPERACILLIN AND TAZOBACTAM 25 GRAM(S): 4; .5 INJECTION, POWDER, LYOPHILIZED, FOR SOLUTION INTRAVENOUS at 21:37

## 2019-11-13 RX ADMIN — Medication 8: at 17:13

## 2019-11-13 RX ADMIN — ALBUTEROL 1 PUFF(S): 90 AEROSOL, METERED ORAL at 11:52

## 2019-11-13 RX ADMIN — Medication 2 MILLILITER(S): at 22:10

## 2019-11-13 RX ADMIN — Medication 10 MILLIGRAM(S): at 13:55

## 2019-11-13 RX ADMIN — Medication 3 MILLILITER(S): at 03:43

## 2019-11-13 RX ADMIN — ALBUTEROL 1 PUFF(S): 90 AEROSOL, METERED ORAL at 06:10

## 2019-11-13 NOTE — RAPID RESPONSE TEAM SUMMARY - NSSITUATIONBACKGROUNDRRT_GEN_ALL_CORE
Pt admitted to hospital on 11/9 for tx for SOB thought to be 2/2 RML PNA thought to be obstructive in etiology likely 2/2 lung mass. Today RRT was called for acute SOB and hypertension. Patient noted to be in respiratory distress, however pt not cyanotic and o2 sat remained >93%.  with -150 sinus tach with no acute ischemic changes. Pt bp noted to be ~215/100 at that time. Pt immediately placed on bipap Pt admitted to hospital on 11/9 for tx for SOB thought to be 2/2 RML PNA post obstructive in etiology likely 2/2 lung mass. This afternoon, RRT was called for acute SOB and hypertension. Upon assessment, patient noted to be in respiratory distress, however pt was not cyanotic and o2 sat remained >93% during entire RRT episode despite some accessory muscle use noted. EKG performed, and -150 sinus tach, with no acute ischemic changes. Pt bp noted to be ~215/100 at that time. Pt immediately placed on bipap, 18/8 with 30% FiO2, duoneb tx, solumedrol IV (as scheduled), Furosemide 40 mg IVP, and Labetalol 10 mg IVP x1. Pt improved significantly with this tx and was able to be weaned to 2L NC and was speaking full sentences, stating that she was "feeling much better." Elevated blood pressure was noted to have improved to 140's sbp, and HR to 110 s. tach with pulse ox at 98% on 3L NC. CXR obtained for baseline study and for comparison with 11/9 admitting CXR which was found to be unchanged. Patient being followed by pulmonary physician Dr. Rapp, I called him to update on episode.

## 2019-11-13 NOTE — PROGRESS NOTE ADULT - ASSESSMENT
80 y/o female with history of HTN, HLD, DM2, likely CKD III, Hypothyroidism & Thyroid ca s/p surgeryx3 20+yrs ago & mets to R lung status post Radiation at [facility] was there for f/u in July and found to have a possible new mass on R and is scheduled for Bronch on coming Tuesday who p/w  post obstructive PNA.     Acute respiratory failure with hypoxia  - c/w PRN BiPA - patient reports dyspnea happens after food - will get swallow eval as patient may be having aspiration pneumonitis or bronchospasm   - due to PNA w/ underlying lung mets  - O2 supplemented, monitor pulse oxymetry   - c/w Duoneb, Spiriva and increase solumedrol      Pneumonia of right middle lobe  - due to other gram-negative/gram-positive/atypical bacteria  - CT chest showed a large subcarinal mass with bihilar lymph nodes, obstructive pneumonitis vs pneumonia in the right middle and lower lobes   - c/w Zosyn and Azithro    - NGTD on blood cultures  - pulmonary and ID following     Postoperative hypothyroidism  - c/w synthroid  - TSH is <0.005, will recheck and check free T4 - will need to lower synthroid based on results    Malignant neoplasm of right lung  - CT chest showed a large subcarinal mass with bihilar lymph nodes & a left hepatic mass  - patient will follow up with oncologist at [place] upon discharge w/ plans for bronchoscopy   - c/w Femara     Uncontrolled type 2 diabetes mellitus with hyperglycemia  - c/w ISS  - c/w gabapentin     HTN  - c/w losartan    Prophylaxis:  DVT: Lovenox  GI:  Protonix  PT: home w/ home PT

## 2019-11-13 NOTE — PROGRESS NOTE ADULT - ASSESSMENT
80 y/o female w/pmhx of HTN, HLD, DM2, Thyroid ca s/p surgeryx3  20+yrs ago, R lung ca s/p resection at [place], was there for f/u in July and found to have a possible new mass on R and is scheduled for Bronch on coming tuesday.   has , likely obstructive pneumonia  on zosyn and zithromax for post obs pneumonia   FU pna work up  fu cultures   po switch once stable

## 2019-11-13 NOTE — PROGRESS NOTE ADULT - SUBJECTIVE AND OBJECTIVE BOX
INTERVAL HPI:  80 y/o female with  HTN, HLD, DM2, Thyroid ca s/p surgeryx3  20+yrs ago, Hypothyroidism, R lung CA(says thyroid metastatic) s/p Radiation  at [facility] [place] was there for f/u in July and found to have a possible new mass on R and is scheduled for Bronch on coming tuesday.    Pt reports over the last 2-3 days she has been noticing wheezing and gets a bout of cough and along w/that sob.  Not bringing anything up with cough. Had a bout of coughing and couldn't catch her breat afterwards, so son called EMS.  Denies any fevers at home, but febrile in ED..  No sick contacts or travels, no cp, palpitations, n/v/d/c.  Seen by  PMD  few days ago for this and was started on inhalers and states felt a bit better but when gets cough gets the sob.  Non smoker.  Addendum: Pt relates her SOB episodes.  Happens after she is done eating, feels fullness in epigastrium, struggles  to burp, the goes to Respiratory distress.  States it  happened 3 times same way.    OVERNIGHT EVENTS:  Again had Respiratory distress requiring RRT.    Vital Signs Last 24 Hrs  T(C): 36.2 (13 Nov 2019 17:16), Max: 37.2 (13 Nov 2019 11:56)  T(F): 97.1 (13 Nov 2019 17:16), Max: 99 (13 Nov 2019 11:56)  HR: 113 (13 Nov 2019 21:11) (94 - 130)  BP: 132/85 (13 Nov 2019 17:16) (124/54 - 175/92)  BP(mean): --  RR: 18 (13 Nov 2019 17:16) (18 - 19)  SpO2: 97% (13 Nov 2019 21:11) (95% - 99%)    PHYSICAL EXAM:  GEN:         Awake, responsive and comfortable.  HEENT:    Normal.    RESP:       no distress  CVS:             Regular rate and rhythm.   ABD:         Soft, non-tender, non-distended;     MEDICATIONS  (STANDING):  acetylcysteine 10%  Inhalation 2 milliLiter(s) Inhalation three times a day  ALBUTerol    90 MICROgram(s) HFA Inhaler 1 Puff(s) Inhalation every 4 hours  albuterol/ipratropium for Nebulization. 3 milliLiter(s) Nebulizer every 8 hours  azithromycin  IVPB 500 milliGRAM(s) IV Intermittent every 24 hours  dextrose 5%. 1000 milliLiter(s) (50 mL/Hr) IV Continuous <Continuous>  dextrose 50% Injectable 12.5 Gram(s) IV Push once  dextrose 50% Injectable 25 Gram(s) IV Push once  dextrose 50% Injectable 25 Gram(s) IV Push once  enoxaparin Injectable 40 milliGRAM(s) SubCutaneous daily  gabapentin 300 milliGRAM(s) Oral two times a day  insulin lispro (HumaLOG) corrective regimen sliding scale   SubCutaneous three times a day before meals  insulin lispro (HumaLOG) corrective regimen sliding scale   SubCutaneous at bedtime  letrozole 2.5 milliGRAM(s) Oral daily  levothyroxine 137 MICROGram(s) Oral daily  losartan 100 milliGRAM(s) Oral daily  methylPREDNISolone sodium succinate Injectable 40 milliGRAM(s) IV Push every 8 hours  pantoprazole    Tablet 40 milliGRAM(s) Oral before breakfast  piperacillin/tazobactam IVPB.. 3.375 Gram(s) IV Intermittent every 8 hours  tiotropium 18 MICROgram(s) Capsule 1 Capsule(s) Inhalation daily    MEDICATIONS  (PRN):  dextrose 40% Gel 15 Gram(s) Oral once PRN Blood Glucose LESS THAN 70 milliGRAM(s)/deciliter  glucagon  Injectable 1 milliGRAM(s) IntraMuscular once PRN Glucose LESS THAN 70 milligrams/deciliter    LABS:                        10.4   9.39  )-----------( 341      ( 13 Nov 2019 06:23 )             32.0     11-13    137  |  102  |  31<H>  ----------------------------<  352<H>  4.1   |  27  |  1.27    Ca    9.8      13 Nov 2019 06:23  Phos  3.1     11-13  Mg     2.2     11-13 11-12 @ 12:02  pH: 7.39  pCO2: 41  pO2: 82  SaO2: 95      ASSESSMENT AND PLAN:  ·	SOB.  ·	RML Pneumonia, likely post obstructive.  ·	Lung Mass.  ·	Hypothyroidism.  ·	Thyroid CA history.  ·	HTN.  ·	DM.  ·	Leukocytosis.  ·	Anemia.    Pt reports that her Respiratory distress happens when she can not burp after eating. Tries to burp, cant not do it and goes to Respiratory distress,  Consider GI evaluation.

## 2019-11-13 NOTE — PROGRESS NOTE ADULT - SUBJECTIVE AND OBJECTIVE BOX
Pt is a 80 y/o female w/pmhx of HTN, HLD, DM2, Thyroid ca s/p surgeryx3  20+yrs ago, R lung ca s/p resection at [place], was there for f/u in July and found to have a possible new mass on R and is scheduled for Bronch on coming tuesday.  Pt reports over the last 2-3 days she has been noticing wheezing and gets a bout of cough and along w/that sob.  she is not bringing anything up with cough. States today she had a bout of coughing and couldnt catch her breath after and son called ems.  she had denied any fevers at home, but febrile in ed.  pt denies any sick contacts or travels, no cp, palpitations, n/v/d/c.  she had seen pmd few days ago for this and was started on inhalers and states felt a bit better but when gets cough gets the sob. (09 Nov 2019 23:56)  all events noted   feels better but on bipap  this am events noted  right now much better   wants us to call her oncologist    Allergies    sulfa drugs (Unknown)    Intolerances        MEDICATIONS  (STANDING):  acetylcysteine 10%  Inhalation 2 milliLiter(s) Inhalation three times a day  ALBUTerol    90 MICROgram(s) HFA Inhaler 1 Puff(s) Inhalation every 4 hours  albuterol/ipratropium for Nebulization. 3 milliLiter(s) Nebulizer every 8 hours  azithromycin  IVPB 500 milliGRAM(s) IV Intermittent every 24 hours  dextrose 5%. 1000 milliLiter(s) (50 mL/Hr) IV Continuous <Continuous>  dextrose 50% Injectable 12.5 Gram(s) IV Push once  dextrose 50% Injectable 25 Gram(s) IV Push once  dextrose 50% Injectable 25 Gram(s) IV Push once  enoxaparin Injectable 40 milliGRAM(s) SubCutaneous daily  gabapentin 300 milliGRAM(s) Oral two times a day  insulin lispro (HumaLOG) corrective regimen sliding scale   SubCutaneous three times a day before meals  insulin lispro (HumaLOG) corrective regimen sliding scale   SubCutaneous at bedtime  letrozole 2.5 milliGRAM(s) Oral daily  levothyroxine 137 MICROGram(s) Oral daily  losartan 100 milliGRAM(s) Oral daily  methylPREDNISolone sodium succinate Injectable 40 milliGRAM(s) IV Push every 8 hours  pantoprazole    Tablet 40 milliGRAM(s) Oral before breakfast  piperacillin/tazobactam IVPB.. 3.375 Gram(s) IV Intermittent every 8 hours  tiotropium 18 MICROgram(s) Capsule 1 Capsule(s) Inhalation daily    MEDICATIONS  (PRN):  dextrose 40% Gel 15 Gram(s) Oral once PRN Blood Glucose LESS THAN 70 milliGRAM(s)/deciliter  glucagon  Injectable 1 milliGRAM(s) IntraMuscular once PRN Glucose LESS THAN 70 milligrams/deciliter      REVIEW OF SYSTEMS:    feels much better   still cough   short of breath   no nausea vomiting diarrhea     VITAL SIGNS:  T(C): 36.3 (11-13-19 @ 23:03), Max: 37.2 (11-13-19 @ 11:56)  T(F): 97.4 (11-13-19 @ 23:03), Max: 99 (11-13-19 @ 11:56)  HR: 115 (11-13-19 @ 23:03) (94 - 130)  BP: 132/85 (11-13-19 @ 17:16) (124/54 - 175/92)  RR: 18 (11-13-19 @ 23:03) (18 - 19)  SpO2: 98% (11-13-19 @ 23:03) (95% - 100%)  Wt(kg): --    PHYSICAL EXAM:    GENERAL: not in any distress  on nasal cannula   HEENT: Neck is supple, normocephalic, atraumatic   CHEST/LUNG: Clear to auscultation bilaterally; bilateral  rhonchi  no , wheezing  HEART: Regular rate and rhythm; No murmurs, rubs, or gallops  ABDOMEN: Soft, Nontender, Nondistended; Bowel sounds present, no rebound   EXTREMITIES:  2+ Peripheral Pulses, No clubbing, cyanosis, or edema  SKIN: No rashes or lesions  BACK: no pressor sore   NERVOUS SYSTEM:  Alert & Oriented X3, Good concentration  PSYCH: normal affect     LABS:                         10.4   9.39  )-----------( 341      ( 13 Nov 2019 06:23 )             32.0     11-13    137  |  102  |  31<H>  ----------------------------<  352<H>  4.1   |  27  |  1.27    Ca    9.8      13 Nov 2019 06:23  Phos  3.1     11-13  Mg     2.2     11-13            ABG - ( 12 Nov 2019 12:02 )  pH, Arterial: x     pH, Blood: 7.39  /  pCO2: 41    /  pO2: 82    / HCO3: 24    / Base Excess: 0.0   /  SaO2: 95                    Thyroid Stimulating Hormone, Serum: <0.005 uIU/mL (11-13 @ 06:23)                Culture Results:   <10,000 CFU/mL Normal Urogenital Iram (11-10 @ 11:08)  Culture Results:   No growth to date. (11-10 @ 00:46)  Culture Results:   No growth to date. (11-10 @ 00:46)                Radiology:

## 2019-11-14 LAB
ANION GAP SERPL CALC-SCNC: 7 MMOL/L — SIGNIFICANT CHANGE UP (ref 5–17)
BUN SERPL-MCNC: 34 MG/DL — HIGH (ref 7–23)
CALCIUM SERPL-MCNC: 10 MG/DL — SIGNIFICANT CHANGE UP (ref 8.5–10.1)
CHLORIDE SERPL-SCNC: 99 MMOL/L — SIGNIFICANT CHANGE UP (ref 96–108)
CO2 SERPL-SCNC: 29 MMOL/L — SIGNIFICANT CHANGE UP (ref 22–31)
CREAT SERPL-MCNC: 1.21 MG/DL — SIGNIFICANT CHANGE UP (ref 0.5–1.3)
GLUCOSE SERPL-MCNC: 383 MG/DL — HIGH (ref 70–99)
HCT VFR BLD CALC: 35.4 % — SIGNIFICANT CHANGE UP (ref 34.5–45)
HGB BLD-MCNC: 11.5 G/DL — SIGNIFICANT CHANGE UP (ref 11.5–15.5)
MAGNESIUM SERPL-MCNC: 2 MG/DL — SIGNIFICANT CHANGE UP (ref 1.6–2.6)
MCHC RBC-ENTMCNC: 29.3 PG — SIGNIFICANT CHANGE UP (ref 27–34)
MCHC RBC-ENTMCNC: 32.5 GM/DL — SIGNIFICANT CHANGE UP (ref 32–36)
MCV RBC AUTO: 90.3 FL — SIGNIFICANT CHANGE UP (ref 80–100)
NRBC # BLD: 0 /100 WBCS — SIGNIFICANT CHANGE UP (ref 0–0)
PHOSPHATE SERPL-MCNC: 3.7 MG/DL — SIGNIFICANT CHANGE UP (ref 2.5–4.5)
PLATELET # BLD AUTO: 346 K/UL — SIGNIFICANT CHANGE UP (ref 150–400)
POTASSIUM SERPL-MCNC: 4.2 MMOL/L — SIGNIFICANT CHANGE UP (ref 3.5–5.3)
POTASSIUM SERPL-SCNC: 4.2 MMOL/L — SIGNIFICANT CHANGE UP (ref 3.5–5.3)
RBC # BLD: 3.92 M/UL — SIGNIFICANT CHANGE UP (ref 3.8–5.2)
RBC # FLD: 12.7 % — SIGNIFICANT CHANGE UP (ref 10.3–14.5)
SODIUM SERPL-SCNC: 135 MMOL/L — SIGNIFICANT CHANGE UP (ref 135–145)
T4 FREE SERPL-MCNC: 2 NG/DL — HIGH (ref 0.9–1.8)
TSH SERPL-MCNC: <0.005 UIU/ML — LOW (ref 0.36–3.74)
WBC # BLD: 10.86 K/UL — HIGH (ref 3.8–10.5)
WBC # FLD AUTO: 10.86 K/UL — HIGH (ref 3.8–10.5)

## 2019-11-14 PROCEDURE — 99233 SBSQ HOSP IP/OBS HIGH 50: CPT

## 2019-11-14 RX ORDER — SIMETHICONE 80 MG/1
80 TABLET, CHEWABLE ORAL
Refills: 0 | Status: DISCONTINUED | OUTPATIENT
Start: 2019-11-14 | End: 2019-11-18

## 2019-11-14 RX ORDER — LEVOTHYROXINE SODIUM 125 MCG
100 TABLET ORAL DAILY
Refills: 0 | Status: DISCONTINUED | OUTPATIENT
Start: 2019-11-15 | End: 2019-11-15

## 2019-11-14 RX ORDER — SIMETHICONE 80 MG/1
80 TABLET, CHEWABLE ORAL EVERY 8 HOURS
Refills: 0 | Status: DISCONTINUED | OUTPATIENT
Start: 2019-11-14 | End: 2019-11-14

## 2019-11-14 RX ADMIN — Medication 40 MILLIGRAM(S): at 05:38

## 2019-11-14 RX ADMIN — ALBUTEROL 1 PUFF(S): 90 AEROSOL, METERED ORAL at 13:38

## 2019-11-14 RX ADMIN — Medication 2 MILLILITER(S): at 21:35

## 2019-11-14 RX ADMIN — Medication 10: at 17:44

## 2019-11-14 RX ADMIN — GABAPENTIN 300 MILLIGRAM(S): 400 CAPSULE ORAL at 05:37

## 2019-11-14 RX ADMIN — Medication 2 MILLILITER(S): at 05:32

## 2019-11-14 RX ADMIN — Medication 3 MILLILITER(S): at 13:33

## 2019-11-14 RX ADMIN — Medication 10: at 11:47

## 2019-11-14 RX ADMIN — Medication 3 MILLILITER(S): at 05:32

## 2019-11-14 RX ADMIN — GABAPENTIN 300 MILLIGRAM(S): 400 CAPSULE ORAL at 17:47

## 2019-11-14 RX ADMIN — Medication 1 TABLET(S): at 17:47

## 2019-11-14 RX ADMIN — Medication 3 MILLILITER(S): at 21:39

## 2019-11-14 RX ADMIN — ALBUTEROL 1 PUFF(S): 90 AEROSOL, METERED ORAL at 07:05

## 2019-11-14 RX ADMIN — SIMETHICONE 80 MILLIGRAM(S): 80 TABLET, CHEWABLE ORAL at 14:28

## 2019-11-14 RX ADMIN — AZITHROMYCIN 255 MILLIGRAM(S): 500 TABLET, FILM COATED ORAL at 05:39

## 2019-11-14 RX ADMIN — ALBUTEROL 1 PUFF(S): 90 AEROSOL, METERED ORAL at 10:30

## 2019-11-14 RX ADMIN — Medication 2 MILLILITER(S): at 13:35

## 2019-11-14 RX ADMIN — ALBUTEROL 1 PUFF(S): 90 AEROSOL, METERED ORAL at 17:46

## 2019-11-14 RX ADMIN — LOSARTAN POTASSIUM 100 MILLIGRAM(S): 100 TABLET, FILM COATED ORAL at 05:37

## 2019-11-14 RX ADMIN — PANTOPRAZOLE SODIUM 40 MILLIGRAM(S): 20 TABLET, DELAYED RELEASE ORAL at 05:37

## 2019-11-14 RX ADMIN — Medication 10: at 08:01

## 2019-11-14 RX ADMIN — PIPERACILLIN AND TAZOBACTAM 25 GRAM(S): 4; .5 INJECTION, POWDER, LYOPHILIZED, FOR SOLUTION INTRAVENOUS at 05:39

## 2019-11-14 RX ADMIN — Medication 20 MILLIGRAM(S): at 22:08

## 2019-11-14 RX ADMIN — Medication 137 MICROGRAM(S): at 05:37

## 2019-11-14 RX ADMIN — SIMETHICONE 80 MILLIGRAM(S): 80 TABLET, CHEWABLE ORAL at 22:09

## 2019-11-14 RX ADMIN — LETROZOLE 2.5 MILLIGRAM(S): 2.5 TABLET, FILM COATED ORAL at 13:39

## 2019-11-14 NOTE — SWALLOW BEDSIDE ASSESSMENT ADULT - SLP PERTINENT HISTORY OF CURRENT PROBLEM
pt reported she has had multiple endoscopies and a barium swallow study, she stated "they found at mass at my windpipe" pt reported she has had multiple endoscopies and a barium swallow study, she stated "they found at mass at my windpipe". pt denied any difficulty with eating and swallowing

## 2019-11-14 NOTE — CONSULT NOTE ADULT - SUBJECTIVE AND OBJECTIVE BOX
HPI:  Pt is a 78 y/o female w/pmhx of HTN, HLD, DM2, Thyroid ca s/p surgeryx3  20+yrs ago, R lung ca s/p resection at [place], was there for f/u in July and found to have a possible new mass on R and is scheduled for Bronch on coming tuesday.  Pt reports over the last 2-3 days she has been noticing wheezing and gets a bout of cough and along w/that sob.  she is not bringing anything up with cough. States today she had a bout of coughing and couldnt catch her breat after and son called ems.  she had denied any fevers at home, but febrile in ed.  pt denies any sick contacts or travels, no cp, palpitations, n/v/d/c.  she had seen pmd few days ago for this and was started on inhalers and states felt a bit better but when gets cough gets the sob. (09 Nov 2019 23:56)  -------------- As Above ----------------------------------------  The patient states that on the day of admission she had a mid epigastric pain best described as bloating. It caused SOB and she came to the ER. She continued to have episodes of SOB after noted a gas build up in her lower chest. She noted that if she pressed on her epigastrium, she would belch and relieve her SOB.n Patient is on a high fiber diet and takes lactose products.  The patient denies melena, hematochezia, hematemesis, nausea, vomiting, abdominal pain, constipation, diarrhea, or change in bowel movements Had a colonoscopy > 5 years ago.      PAST MEDICAL & SURGICAL HISTORY:  Diabetes type 2, uncontrolled  Essential hypertension  Lung cancer  Hypothyroidism  History of lung surgery  S/P thyroidectomy      MEDICATIONS  (STANDING):  acetylcysteine 10%  Inhalation 2 milliLiter(s) Inhalation three times a day  ALBUTerol    90 MICROgram(s) HFA Inhaler 1 Puff(s) Inhalation every 4 hours  albuterol/ipratropium for Nebulization. 3 milliLiter(s) Nebulizer every 8 hours  amoxicillin  875 milliGRAM(s)/clavulanate 1 Tablet(s) Oral two times a day  dextrose 5%. 1000 milliLiter(s) (50 mL/Hr) IV Continuous <Continuous>  dextrose 50% Injectable 12.5 Gram(s) IV Push once  dextrose 50% Injectable 25 Gram(s) IV Push once  dextrose 50% Injectable 25 Gram(s) IV Push once  enoxaparin Injectable 40 milliGRAM(s) SubCutaneous daily  gabapentin 300 milliGRAM(s) Oral two times a day  insulin lispro (HumaLOG) corrective regimen sliding scale   SubCutaneous three times a day before meals  insulin lispro (HumaLOG) corrective regimen sliding scale   SubCutaneous at bedtime  letrozole 2.5 milliGRAM(s) Oral daily  losartan 100 milliGRAM(s) Oral daily  pantoprazole    Tablet 40 milliGRAM(s) Oral before breakfast  predniSONE   Tablet 20 milliGRAM(s) Oral daily  simethicone 80 milliGRAM(s) Chew every 8 hours  tiotropium 18 MICROgram(s) Capsule 1 Capsule(s) Inhalation daily    MEDICATIONS  (PRN):  dextrose 40% Gel 15 Gram(s) Oral once PRN Blood Glucose LESS THAN 70 milliGRAM(s)/deciliter  glucagon  Injectable 1 milliGRAM(s) IntraMuscular once PRN Glucose LESS THAN 70 milligrams/deciliter      Allergies    sulfa drugs (Unknown)    Intolerances        FAMILY HISTORY:  No pertinent family history in first degree relatives      REVIEW OF SYSTEMS:    CONSTITUTIONAL: No fever, weight loss, or fatigue  EYES: No eye pain, visual disturbances, or discharge  ENMT:  No difficulty hearing, tinnitus, vertigo; No sinus or throat pain  NECK: No pain or stiffness  BREASTS: No pain, masses, or nipple discharge  RESPIRATORY: No cough, wheezing, chills or hemoptysis;   CARDIOVASCULAR: No chest pain, palpitations, dizziness, or leg swelling  GASTROINTESTINAL: See above  GENITOURINARY: No dysuria, frequency, hematuria, or incontinence  NEUROLOGICAL: No headaches, memory loss, loss of strength, numbness, or tremors  SKIN: No itching, burning, rashes, or lesions   LYMPH NODES: No enlarged glands  ENDOCRINE: No heat or cold intolerance; No hair loss  MUSCULOSKELETAL: No joint pain or swelling; No muscle, back, or extremity pain  PSYCHIATRIC: No depression, anxiety, mood swings, or difficulty sleeping  HEME/LYMPH: No easy bruising, or bleeding gums  ALLERGY AND IMMUNOLOGIC: No hives or eczema          SOCIAL HISTORY:    FAMILY HISTORY:  No pertinent family history in first degree relatives      Vital Signs Last 24 Hrs  T(C): 36.8 (14 Nov 2019 18:05), Max: 37.1 (14 Nov 2019 05:21)  T(F): 98.3 (14 Nov 2019 18:05), Max: 98.7 (14 Nov 2019 05:21)  HR: 144 (14 Nov 2019 18:59) (84 - 144)  BP: 145/80 (14 Nov 2019 18:05) (137/73 - 151/87)  BP(mean): --  RR: 18 (14 Nov 2019 18:05) (18 - 19)  SpO2: 98% (14 Nov 2019 18:59) (94% - 100%)    PHYSICAL EXAM:    GENERAL: NAD, well-groomed, well-developed  HEAD:  Atraumatic, Normocephalic  EYES: EOMI, PERRLA, conjunctiva and sclera clear  NECK: Supple, No JVD, Normal thyroid  NERVOUS SYSTEM:  Alert & Oriented X3, Good concentration; Motor Strength 5/5 B/L upper and lower extremities;   CHEST/LUNG: Clear to percussion bilaterally; No rales, rhonchi, wheezing, or rubs  HEART: Regular rate and rhythm; No murmurs, rubs, or gallops  ABDOMEN: Soft, Nontender, Nondistended; Bowel sounds present  EXTREMITIES:  2+ Peripheral Pulses, No clubbing, cyanosis, or edema  LYMPH: No lymphadenopathy noted   RECTAL: Deferred   SKIN: No rashes or lesions    LABS:                        11.5   10.86 )-----------( 346      ( 14 Nov 2019 08:19 )             35.4       CBC:  11-14 @ 08:19  WBC  10.86  HGB 11.5  HCT 35.4 Plate 346  MCV 90.3  11-13 @ 06:23  WBC  9.39  HGB 10.4  HCT 32.0 Plate 341  MCV 90.1  11-12 @ 06:38  WBC  11.75  HGB 10.3  HCT 31.5 Plate 326  MCV 90.8  11-11 @ 06:24  WBC  12.35  HGB 11.1  HCT 34.6 Plate 353  MCV 91.8  11-09 @ 20:23  WBC  13.84  HGB 10.8  HCT 32.9 Plate 328  MCV 90.4           14 Nov 2019 08:19    135    |  99     |  34     ----------------------------<  383    4.2     |  29     |  1.21   13 Nov 2019 06:23    137    |  102    |  31     ----------------------------<  352    4.1     |  27     |  1.27   12 Nov 2019 06:38    135    |  101    |  32     ----------------------------<  332    4.5     |  26     |  1.26   11 Nov 2019 06:24    136    |  102    |  23     ----------------------------<  316    4.7     |  28     |  1.22   09 Nov 2019 20:23    134    |  100    |  21     ----------------------------<  231    4.2     |  25     |  1.22     Ca    10.0       14 Nov 2019 08:19  Ca    9.8        13 Nov 2019 06:23  Ca    9.7        12 Nov 2019 06:38  Ca    9.6        11 Nov 2019 06:24  Ca    9.1        09 Nov 2019 20:23  Phos  3.7       14 Nov 2019 08:19  Phos  3.1       13 Nov 2019 06:23  Mg     2.0       14 Nov 2019 08:19  Mg     2.2       13 Nov 2019 06:23    TPro  7.9    /  Alb  3.1    /  TBili  0.3    /  DBili  x      /  AST  17     /  ALT  34     /  AlkPhos  148    11 Nov 2019 06:24  TPro  7.9    /  Alb  3.1    /  TBili  0.3    /  DBili  x      /  AST  30     /  ALT  38     /  AlkPhos  157    09 Nov 2019 20:23            RADIOLOGY & ADDITIONAL STUDIES:

## 2019-11-14 NOTE — SWALLOW BEDSIDE ASSESSMENT ADULT - SWALLOW EVAL: PATIENT/FAMILY GOALS STATEMENT
pt stated "when food gets to my esophagus, it feels like bubbling" pt stated "when food gets to my esophagus, it feels like bubbling", pt reported SOB after eating since admission to the hospital on 11/9/2019

## 2019-11-14 NOTE — PROGRESS NOTE ADULT - SUBJECTIVE AND OBJECTIVE BOX
Pt is a 80 y/o female w/pmhx of HTN, HLD, DM2, Thyroid ca s/p surgeryx3  20+yrs ago, R lung ca s/p resection at [place], was there for f/u in July and found to have a possible new mass on R and is scheduled for Bronch on coming tuesday.  Pt reports over the last 2-3 days she has been noticing wheezing and gets a bout of cough and along w/that sob.  she is not bringing anything up with cough. States today she had a bout of coughing and couldnt catch her breath after and son called ems.  she had denied any fevers at home, but febrile in ed.  pt denies any sick contacts or travels, no cp, palpitations, n/v/d/c.  she had seen pmd few days ago for this and was started on inhalers and states felt a bit better but when gets cough gets the sob. (09 Nov 2019 23:56)  all events noted   off bipap 24-36 hours  feels great   only complaint is food    Allergies    sulfa drugs (Unknown)    Intolerances        MEDICATIONS  (STANDING):  acetylcysteine 10%  Inhalation 2 milliLiter(s) Inhalation three times a day  ALBUTerol    90 MICROgram(s) HFA Inhaler 1 Puff(s) Inhalation every 4 hours  albuterol/ipratropium for Nebulization. 3 milliLiter(s) Nebulizer every 8 hours  azithromycin  IVPB 500 milliGRAM(s) IV Intermittent every 24 hours  dextrose 5%. 1000 milliLiter(s) (50 mL/Hr) IV Continuous <Continuous>  dextrose 50% Injectable 12.5 Gram(s) IV Push once  dextrose 50% Injectable 25 Gram(s) IV Push once  dextrose 50% Injectable 25 Gram(s) IV Push once  enoxaparin Injectable 40 milliGRAM(s) SubCutaneous daily  gabapentin 300 milliGRAM(s) Oral two times a day  insulin lispro (HumaLOG) corrective regimen sliding scale   SubCutaneous three times a day before meals  insulin lispro (HumaLOG) corrective regimen sliding scale   SubCutaneous at bedtime  letrozole 2.5 milliGRAM(s) Oral daily  levothyroxine 137 MICROGram(s) Oral daily  losartan 100 milliGRAM(s) Oral daily  pantoprazole    Tablet 40 milliGRAM(s) Oral before breakfast  piperacillin/tazobactam IVPB.. 3.375 Gram(s) IV Intermittent every 8 hours  predniSONE   Tablet 20 milliGRAM(s) Oral daily  simethicone 80 milliGRAM(s) Chew every 8 hours  tiotropium 18 MICROgram(s) Capsule 1 Capsule(s) Inhalation daily    MEDICATIONS  (PRN):  dextrose 40% Gel 15 Gram(s) Oral once PRN Blood Glucose LESS THAN 70 milliGRAM(s)/deciliter  glucagon  Injectable 1 milliGRAM(s) IntraMuscular once PRN Glucose LESS THAN 70 milligrams/deciliter      REVIEW OF SYSTEMS:    CONSTITUTIONAL: No fever, chills, weight loss, or fatigue  RESPIRATORY: resolving  cough,   minimal wheezing naomy after food when she burps she wheezes  not short of breath today   CARDIOVASCULAR: No chest pain, palpitations, dizziness  GASTROINTESTINAL: No abdominal pain. No nausea, vomiting, diarrhea    VITAL SIGNS:  T(C): 36.8 (11-14-19 @ 11:25), Max: 37.1 (11-14-19 @ 05:21)  T(F): 98.3 (11-14-19 @ 11:25), Max: 98.7 (11-14-19 @ 05:21)  HR: 109 (11-14-19 @ 11:25) (84 - 128)  BP: 137/73 (11-14-19 @ 11:25) (132/85 - 151/87)  RR: 19 (11-14-19 @ 11:25) (18 - 19)  SpO2: 99% (11-14-19 @ 11:25) (95% - 100%)  Wt(kg): --    PHYSICAL EXAM:    GENERAL: not in any distress  HEENT: Neck is supple, normocephalic, atraumatic   CHEST/LUNG: Clear to auscultation bilaterally; No rales, rhonchi, wheezing  minimal exp wheeze left lung with decreased breath sounds   HEART: Regular rate and rhythm; No murmurs, rubs, or gallops  ABDOMEN: Soft, Nontender, Nondistended; Bowel sounds present, no rebound   EXTREMITIES:  2+ Peripheral Pulses, No clubbing, cyanosis, or edema  SKIN: No rashes or lesions  BACK: no pressor sore   NERVOUS SYSTEM:  Alert & Oriented X3, Good concentration  PSYCH: normal affect     LABS:                         11.5   10.86 )-----------( 346      ( 14 Nov 2019 08:19 )             35.4     11-14    135  |  99  |  34<H>  ----------------------------<  383<H>  4.2   |  29  |  1.21    Ca    10.0      14 Nov 2019 08:19  Phos  3.7     11-14  Mg     2.0     11-14                  Thyroid Stimulating Hormone, Serum: <0.005 uIU/mL (11-14 @ 08:19)                Culture Results:   <10,000 CFU/mL Normal Urogenital Iram (11-10 @ 11:08)  Culture Results:   No growth to date. (11-10 @ 00:46)  Culture Results:   No growth to date. (11-10 @ 00:46)                Radiology:

## 2019-11-14 NOTE — SWALLOW BEDSIDE ASSESSMENT ADULT - SLP GENERAL OBSERVATIONS
pt was seen bedside alert and oriented x4. pt responded to autobiographical questions, verbalized wants, and followed directions. noted good speech intelligibility and vocal quality. pt currently on nasal cannula and reported that she is on bipap at night, noted good breath support during evaluation.

## 2019-11-14 NOTE — PROGRESS NOTE ADULT - ASSESSMENT
80 y/o female with history of HTN, HLD, DM2, likely CKD III, Hypothyroidism & Thyroid ca s/p surgeryx3 20+yrs ago & mets to R lung status [place] [facility] [place] for f/u in July and found to have a possible new mass on R lung who p/w  post obstructive PNA and COPD exacerbation.     Acute respiratory failure with hypoxia  - c/w PRN BiPA   - due to PNA w/ underlying lung mets  - O2 supplemented, monitor pulse oxymetry   - c/w Duoneb, Spiriva and increase solumedrol    - Patient reports dyspnea happens after food - will get GI consult    Pneumonia of right middle lobe  - due to other gram-negative/gram-positive/atypical bacteria  - CT chest showed a large subcarinal mass with bihilar lymph nodes, obstructive pneumonitis vs pneumonia in the right middle and lower lobes   - ID switched Zosyn and Azithro to Augmentin  - NGTD on blood cultures  - pulmonary and ID following - will switch to PO regimen as per ID    Postoperative hypothyroidism  - TSH is <0.005, and free T4 is 2  - lower synthroid to 100mcg and consult endo    Malignant neoplasm of right lung  - CT chest showed a large subcarinal mass with bihilar lymph nodes & a left hepatic mass  - patient will follow up with oncologist at [facility] upon discharge w/ plans for bronchoscopy   - c/w Femara     Uncontrolled type 2 diabetes mellitus with hyperglycemia  - c/w ISS  - c/w gabapentin     HTN  - c/w losartan    Prophylaxis:  DVT: Lovenox  GI:  Protonix  PT: home w/ home PT

## 2019-11-14 NOTE — PROGRESS NOTE ADULT - SUBJECTIVE AND OBJECTIVE BOX
80 y/o female with history of HTN, HLD, DM2, likely CKD III, Hypothyroidism & Thyroid ca s/p surgeryx3 20+yrs ago & mets to R lung status post Radiation at [facility] was there for f/u in July and found to have a possible new mass on R  who p/w  post obstructive PNA. She  is lying in bed in NAD.    MEDICATIONS  (STANDING):  acetylcysteine 10%  Inhalation 2 milliLiter(s) Inhalation three times a day  ALBUTerol    90 MICROgram(s) HFA Inhaler 1 Puff(s) Inhalation every 4 hours  albuterol/ipratropium for Nebulization. 3 milliLiter(s) Nebulizer every 8 hours  amoxicillin  875 milliGRAM(s)/clavulanate 1 Tablet(s) Oral two times a day  dextrose 5%. 1000 milliLiter(s) (50 mL/Hr) IV Continuous <Continuous>  dextrose 50% Injectable 12.5 Gram(s) IV Push once  dextrose 50% Injectable 25 Gram(s) IV Push once  dextrose 50% Injectable 25 Gram(s) IV Push once  enoxaparin Injectable 40 milliGRAM(s) SubCutaneous daily  gabapentin 300 milliGRAM(s) Oral two times a day  insulin lispro (HumaLOG) corrective regimen sliding scale   SubCutaneous three times a day before meals  insulin lispro (HumaLOG) corrective regimen sliding scale   SubCutaneous at bedtime  letrozole 2.5 milliGRAM(s) Oral daily  levothyroxine 137 MICROGram(s) Oral daily  losartan 100 milliGRAM(s) Oral daily  pantoprazole    Tablet 40 milliGRAM(s) Oral before breakfast  predniSONE   Tablet 20 milliGRAM(s) Oral daily  simethicone 80 milliGRAM(s) Chew every 8 hours  tiotropium 18 MICROgram(s) Capsule 1 Capsule(s) Inhalation daily    MEDICATIONS  (PRN):  dextrose 40% Gel 15 Gram(s) Oral once PRN Blood Glucose LESS THAN 70 milliGRAM(s)/deciliter  glucagon  Injectable 1 milliGRAM(s) IntraMuscular once PRN Glucose LESS THAN 70 milligrams/deciliter      Allergies    sulfa drugs (Unknown)    Intolerances     Vital Signs Last 24 Hrs  T(C): 36.8 (14 Nov 2019 18:05), Max: 37.1 (14 Nov 2019 05:21)  T(F): 98.3 (14 Nov 2019 18:05), Max: 98.7 (14 Nov 2019 05:21)  HR: 144 (14 Nov 2019 18:59) (84 - 144)  BP: 145/80 (14 Nov 2019 18:05) (137/73 - 151/87)  BP(mean): --  RR: 18 (14 Nov 2019 18:05) (18 - 19)  SpO2: 98% (14 Nov 2019 18:59) (94% - 100%)    PHYSICAL EXAM:  GENERAL: NAD, well-groomed, well-developed  HEAD:  Atraumatic, Normocephalic  EYES: EOMI, PERRLA   NECK: Supple   NERVOUS SYSTEM:  Alert    CHEST/LUNG: Clear to auscultation bilaterally; No rales, rhonchi, wheezing, or rubs  HEART: Regular rate and rhythm; No murmurs, rubs, or gallops  ABDOMEN: Soft, Nontender, Nondistended; Bowel sounds present  EXTREMITIES:  No clubbing, cyanosis, or edema      LABS:                        11.5   10.86 )-----------( 346      ( 14 Nov 2019 08:19 )             35.4     11-14    135  |  99  |  34<H>  ----------------------------<  383<H>  4.2   |  29  |  1.21    Ca    10.0      14 Nov 2019 08:19  Phos  3.7     11-14  Mg     2.0     11-14          CAPILLARY BLOOD GLUCOSE      POCT Blood Glucose.: 361 mg/dL (14 Nov 2019 16:59)  POCT Blood Glucose.: 385 mg/dL (14 Nov 2019 11:29)  POCT Blood Glucose.: 366 mg/dL (14 Nov 2019 07:24)  POCT Blood Glucose.: 328 mg/dL (13 Nov 2019 21:34)      Culture - Urine (collected 10 Nov 2019 11:08)  Source: .Urine Clean Catch (Midstream)  Final Report (11 Nov 2019 07:47):    <10,000 CFU/mL Normal Urogenital Iram    Culture - Blood (collected 10 Nov 2019 00:46)  Source: .Blood Blood  Preliminary Report (11 Nov 2019 01:07):    No growth to date.    Culture - Blood (collected 10 Nov 2019 00:46)  Source: .Blood Blood  Preliminary Report (11 Nov 2019 01:07):    No growth to date.      RADIOLOGY & ADDITIONAL TESTS:    11-13-19 @ 07:01  -  11-14-19 @ 07:00  --------------------------------------------------------  IN:    Oral Fluid: 240 mL  Total IN: 240 mL    OUT:    Voided: 750 mL  Total OUT: 750 mL    Total NET: -510 mL

## 2019-11-14 NOTE — CONSULT NOTE ADULT - ASSESSMENT
HPI:  Pt is a 80 y/o female w/pmhx of HTN, HLD, DM2, Thyroid ca s/p surgeryx3  20+yrs ago, R lung ca s/p resection at [place], was there for f/u in July and found to have a possible new mass on R and is scheduled for Bronch on coming tuesday.  Pt reports over the last 2-3 days she has been noticing wheezing and gets a bout of cough and along w/that sob.  she is not bringing anything up with cough. States today she had a bout of coughing and couldnt catch her breat after and son called ems.  she had denied any fevers at home, but febrile in ed.  pt denies any sick contacts or travels, no cp, palpitations, n/v/d/c.  she had seen pmd few days ago for this and was started on inhalers and states felt a bit better but when gets cough gets the sob. (09 Nov 2019 23:56)  -------------- As Above ----------------------------------------  The patient states that on the day of admission she had a mid epigastric pain best described as bloating. It caused SOB and she came to the ER. She continued to have episodes of SOB after noted a gas build up in her lower chest. She noted that if she pressed on her epigastrium, she would belch and relieve her SOB.n Patient is on a high fiber diet and takes lactose products.  The patient denies melena, hematochezia, hematemesis, nausea, vomiting, abdominal pain, constipation, diarrhea, or change in bowel movements Had a colonoscopy > 5 years ago.    Gas bloating syndrome 1) simethicone  2) low fiber diet 3) lactose free diet

## 2019-11-14 NOTE — SWALLOW BEDSIDE ASSESSMENT ADULT - H & P REVIEW
yes/Pt is a 78 y/o female w/pmhx of HTN, HLD, DM2, Thyroid ca s/p surgeryx3  20+yrs ago, R lung ca s/p resection at [place], was there for f/u in July and found to have a possible new mass on R and is scheduled for Bronch on coming tuesday.  Pt reports over the last 2-3 days she has been noticing wheezing and gets a bout of cough and along w/that sob.  she is not bringing anything up with cough. States today she had a bout of coughing and couldnt catch her breat after and son called ems.  she had denied any fevers at home, but febrile in ed.  pt denies any sick contacts or travels, no cp, palpitations, n/v/d/c.  she had seen pmd few days ago for this and was started on inhalers and states felt a bit better but when gets cough gets the sob.  RRT on 11/13 difficulty breathing with exacerbation of wheezing yes/Pt is a 78 y/o female w/pmhx of HTN, HLD, DM2, Thyroid ca s/p surgeryx3  20+yrs ago, R lung ca s/p resection at [place], was there for f/u in July and found to have a possible new mass on R and is scheduled for Bronch on coming tuesday.  Pt reports over the last 2-3 days she has been noticing wheezing and gets a bout of cough and along w/that sob.  she is not bringing anything up with cough. States today she had a bout of coughing and couldnt catch her breat after and son called ems.  she had denied any fevers at home, but febrile in ed.  pt denies any sick contacts or travels, no cp, palpitations, n/v/d/c.  she had seen pmd few days ago for this and was started on inhalers and states felt a bit better but when gets cough gets the sob.  RRT on 11/12 and 11/13 difficulty breathing with exacerbation of wheezing

## 2019-11-14 NOTE — PROGRESS NOTE ADULT - ASSESSMENT
80 y/o female w/pmhx of HTN, HLD, DM2, Thyroid ca s/p surgeryx3  20+yrs ago, R lung ca s/p resection at [place], was there for f/u in July and found to have a possible new mass on R and is scheduled for Bronch on coming tuesday.   has , likely obstructive pneumonia  on zosyn and zithromax for post obs pneumonia   FU pna work up  fu cultures   po switch   discharge plan

## 2019-11-14 NOTE — SWALLOW BEDSIDE ASSESSMENT ADULT - ESOPHAGEAL PHASE
c/o bubbling/stasis, pt able to clear and spit c/o bubbling/stasis c/o bubbling/ feeling "something stuck" in esophagus at chest level, pt able to clear and spit clear thin secretions c/o bubbling/ feeling "something stuck"

## 2019-11-14 NOTE — SWALLOW BEDSIDE ASSESSMENT ADULT - COMMENTS
CXR 11/13/19 IMPRESSION: 1. Subcarinal mass better appreciated on 11/9/19 CT angio of chest. 2. Small right pleural effusion. 3. Right basilar atelectasis and/or pneumonia as above.  CT Angio Chest w/ IV Contrast 11/9/2019 IMPRESSION: No central pulmonary embolism. Large subcarinal mass with bihilar lymph nodes. Obstructive pneumonitis versus pneumonia in the right middle and lower lobes. Left hepatic mass. Cannot exclude malignancy (primary or metastatic). CXR 11/13/19 IMPRESSION: 1. Subcarinal mass better appreciated on 11/9/19 CT angio of chest. 2. Small right pleural effusion. 3. Right basilar atelectasis and/or pneumonia as above.    CT Angio Chest w/ IV Contrast 11/9/2019 IMPRESSION: No central pulmonary embolism. Large subcarinal mass with bihilar lymph nodes. Obstructive pneumonitis versus pneumonia in the right middle and lower lobes. Left hepatic mass. Cannot exclude malignancy (primary or metastatic).

## 2019-11-14 NOTE — PROGRESS NOTE ADULT - SUBJECTIVE AND OBJECTIVE BOX
INTERVAL HPI:  80 y/o female with  HTN, HLD, DM2, Thyroid ca s/p surgeryx3  20+yrs ago, Hypothyroidism, R lung CA(says thyroid metastatic) [place] [facility] [place] in July and found to have a possible new mass on R and is scheduled for Bronch on coming tuesday.    Pt reports over the last 2-3 days she has been noticing wheezing and gets a bout of cough and along w/that sob.  Not bringing anything up with cough. Had a bout of coughing and couldn't catch her breat afterwards, so son called EMS.  Denies any fevers at home, but febrile in ED..  No sick contacts or travels, no cp, palpitations, n/v/d/c.  Seen by  PMD  few days ago for this and was started on inhalers and states felt a bit better but when gets cough gets the sob.  Non smoker.  Addendum: Pt relates her SOB episodes.  Happens after she is done eating, feels fullness in epigastrium, struggles  to burp, the goes to Respiratory distress.  States it  happened 3 times same way.    OVERNIGHT EVENTS:  Awake comfortable and feels better.    Vital Signs Last 24 Hrs  T(C): 36.8 (14 Nov 2019 11:25), Max: 37.1 (14 Nov 2019 05:21)  T(F): 98.3 (14 Nov 2019 11:25), Max: 98.7 (14 Nov 2019 05:21)  HR: 109 (14 Nov 2019 11:25) (84 - 130)  BP: 137/73 (14 Nov 2019 11:25) (132/85 - 151/87)  BP(mean): --  RR: 19 (14 Nov 2019 11:25) (18 - 19)  SpO2: 99% (14 Nov 2019 11:25) (95% - 100%)    PHYSICAL EXAM:  GEN:         Awake, responsive and comfortable.  HEENT:    Normal.    RESP:        no wheezing.  CVS:           Regular rate and rhythm.   ABD:         Soft, non-tender, non-distended;     MEDICATIONS  (STANDING):  acetylcysteine 10%  Inhalation 2 milliLiter(s) Inhalation three times a day  ALBUTerol    90 MICROgram(s) HFA Inhaler 1 Puff(s) Inhalation every 4 hours  albuterol/ipratropium for Nebulization. 3 milliLiter(s) Nebulizer every 8 hours  azithromycin  IVPB 500 milliGRAM(s) IV Intermittent every 24 hours  dextrose 5%. 1000 milliLiter(s) (50 mL/Hr) IV Continuous <Continuous>  dextrose 50% Injectable 12.5 Gram(s) IV Push once  dextrose 50% Injectable 25 Gram(s) IV Push once  dextrose 50% Injectable 25 Gram(s) IV Push once  enoxaparin Injectable 40 milliGRAM(s) SubCutaneous daily  gabapentin 300 milliGRAM(s) Oral two times a day  insulin lispro (HumaLOG) corrective regimen sliding scale   SubCutaneous three times a day before meals  insulin lispro (HumaLOG) corrective regimen sliding scale   SubCutaneous at bedtime  letrozole 2.5 milliGRAM(s) Oral daily  levothyroxine 137 MICROGram(s) Oral daily  losartan 100 milliGRAM(s) Oral daily  methylPREDNISolone sodium succinate Injectable 40 milliGRAM(s) IV Push every 8 hours  pantoprazole    Tablet 40 milliGRAM(s) Oral before breakfast  piperacillin/tazobactam IVPB.. 3.375 Gram(s) IV Intermittent every 8 hours  tiotropium 18 MICROgram(s) Capsule 1 Capsule(s) Inhalation daily    MEDICATIONS  (PRN):  dextrose 40% Gel 15 Gram(s) Oral once PRN Blood Glucose LESS THAN 70 milliGRAM(s)/deciliter  glucagon  Injectable 1 milliGRAM(s) IntraMuscular once PRN Glucose LESS THAN 70 milligrams/deciliter    LABS:                        11.5   10.86 )-----------( 346      ( 14 Nov 2019 08:19 )             35.4     11-14    135  |  99  |  34<H>  ----------------------------<  383<H>  4.2   |  29  |  1.21    Ca    10.0      14 Nov 2019 08:19  Phos  3.7     11-14  Mg     2.0     11-14 11-12 @ 12:02  pH: 7.39  pCO2: 41  pO2: 82  SaO2: 95      ASSESSMENT AND PLAN:  ·	SOB.  ·	RML Pneumonia, likely post obstructive.  ·	Lung Mass.  ·	Hypothyroidism.  ·	Thyroid CA history.  ·	HTN.  ·	DM.  ·	Leukocytosis.  ·	Anemia.    Pt reports that her Respiratory distress happens when she can not burp after eating. Tries to burp, cant not do it and goes to Respiratory distress,  Consider GI evaluation.  SPO2 97% on room air.  Spoke with Nurse Mack in  her Oncologist office( [phone]) and gave update.  Can be changed to PO antibiotics.

## 2019-11-15 LAB
ANION GAP SERPL CALC-SCNC: 8 MMOL/L — SIGNIFICANT CHANGE UP (ref 5–17)
BUN SERPL-MCNC: 39 MG/DL — HIGH (ref 7–23)
CALCIUM SERPL-MCNC: 9.8 MG/DL — SIGNIFICANT CHANGE UP (ref 8.5–10.1)
CHLORIDE SERPL-SCNC: 102 MMOL/L — SIGNIFICANT CHANGE UP (ref 96–108)
CO2 SERPL-SCNC: 29 MMOL/L — SIGNIFICANT CHANGE UP (ref 22–31)
CREAT SERPL-MCNC: 1.15 MG/DL — SIGNIFICANT CHANGE UP (ref 0.5–1.3)
CULTURE RESULTS: SIGNIFICANT CHANGE UP
CULTURE RESULTS: SIGNIFICANT CHANGE UP
GLUCOSE SERPL-MCNC: 345 MG/DL — HIGH (ref 70–99)
HBA1C BLD-MCNC: 8.4 % — HIGH (ref 4–5.6)
HCT VFR BLD CALC: 35.7 % — SIGNIFICANT CHANGE UP (ref 34.5–45)
HGB BLD-MCNC: 11.6 G/DL — SIGNIFICANT CHANGE UP (ref 11.5–15.5)
MAGNESIUM SERPL-MCNC: 2 MG/DL — SIGNIFICANT CHANGE UP (ref 1.6–2.6)
MCHC RBC-ENTMCNC: 29.7 PG — SIGNIFICANT CHANGE UP (ref 27–34)
MCHC RBC-ENTMCNC: 32.5 GM/DL — SIGNIFICANT CHANGE UP (ref 32–36)
MCV RBC AUTO: 91.5 FL — SIGNIFICANT CHANGE UP (ref 80–100)
NRBC # BLD: 0 /100 WBCS — SIGNIFICANT CHANGE UP (ref 0–0)
PLATELET # BLD AUTO: 320 K/UL — SIGNIFICANT CHANGE UP (ref 150–400)
POTASSIUM SERPL-MCNC: 4.3 MMOL/L — SIGNIFICANT CHANGE UP (ref 3.5–5.3)
POTASSIUM SERPL-SCNC: 4.3 MMOL/L — SIGNIFICANT CHANGE UP (ref 3.5–5.3)
PROCALCITONIN SERPL-MCNC: 0.04 NG/ML — SIGNIFICANT CHANGE UP (ref 0.02–0.1)
RBC # BLD: 3.9 M/UL — SIGNIFICANT CHANGE UP (ref 3.8–5.2)
RBC # FLD: 12.8 % — SIGNIFICANT CHANGE UP (ref 10.3–14.5)
SODIUM SERPL-SCNC: 139 MMOL/L — SIGNIFICANT CHANGE UP (ref 135–145)
SPECIMEN SOURCE: SIGNIFICANT CHANGE UP
SPECIMEN SOURCE: SIGNIFICANT CHANGE UP
WBC # BLD: 12.29 K/UL — HIGH (ref 3.8–10.5)
WBC # FLD AUTO: 12.29 K/UL — HIGH (ref 3.8–10.5)

## 2019-11-15 PROCEDURE — 99233 SBSQ HOSP IP/OBS HIGH 50: CPT

## 2019-11-15 RX ORDER — LEVOTHYROXINE SODIUM 125 MCG
137 TABLET ORAL
Refills: 0 | Status: DISCONTINUED | OUTPATIENT
Start: 2019-11-15 | End: 2019-11-16

## 2019-11-15 RX ORDER — LEVOTHYROXINE SODIUM 125 MCG
150 TABLET ORAL
Refills: 0 | Status: DISCONTINUED | OUTPATIENT
Start: 2019-11-15 | End: 2019-11-16

## 2019-11-15 RX ORDER — ATENOLOL 25 MG/1
1 TABLET ORAL
Qty: 0 | Refills: 0 | DISCHARGE

## 2019-11-15 RX ORDER — INSULIN GLARGINE 100 [IU]/ML
10 INJECTION, SOLUTION SUBCUTANEOUS AT BEDTIME
Refills: 0 | Status: DISCONTINUED | OUTPATIENT
Start: 2019-11-15 | End: 2019-11-18

## 2019-11-15 RX ORDER — ATENOLOL 25 MG/1
25 TABLET ORAL DAILY
Refills: 0 | Status: DISCONTINUED | OUTPATIENT
Start: 2019-11-15 | End: 2019-11-18

## 2019-11-15 RX ORDER — LEVOTHYROXINE SODIUM 125 MCG
0 TABLET ORAL
Qty: 26 | Refills: 0 | DISCHARGE

## 2019-11-15 RX ADMIN — Medication 100 MICROGRAM(S): at 05:59

## 2019-11-15 RX ADMIN — GABAPENTIN 300 MILLIGRAM(S): 400 CAPSULE ORAL at 17:12

## 2019-11-15 RX ADMIN — Medication 3 MILLILITER(S): at 21:06

## 2019-11-15 RX ADMIN — Medication 1 TABLET(S): at 17:12

## 2019-11-15 RX ADMIN — ALBUTEROL 1 PUFF(S): 90 AEROSOL, METERED ORAL at 05:58

## 2019-11-15 RX ADMIN — Medication 20 MILLIGRAM(S): at 11:35

## 2019-11-15 RX ADMIN — LETROZOLE 2.5 MILLIGRAM(S): 2.5 TABLET, FILM COATED ORAL at 14:09

## 2019-11-15 RX ADMIN — ALBUTEROL 1 PUFF(S): 90 AEROSOL, METERED ORAL at 11:35

## 2019-11-15 RX ADMIN — Medication 8: at 07:47

## 2019-11-15 RX ADMIN — SIMETHICONE 80 MILLIGRAM(S): 80 TABLET, CHEWABLE ORAL at 07:50

## 2019-11-15 RX ADMIN — ATENOLOL 25 MILLIGRAM(S): 25 TABLET ORAL at 16:47

## 2019-11-15 RX ADMIN — INSULIN GLARGINE 10 UNIT(S): 100 INJECTION, SOLUTION SUBCUTANEOUS at 23:06

## 2019-11-15 RX ADMIN — ALBUTEROL 1 PUFF(S): 90 AEROSOL, METERED ORAL at 17:13

## 2019-11-15 RX ADMIN — Medication 3 MILLILITER(S): at 13:28

## 2019-11-15 RX ADMIN — Medication 6: at 11:36

## 2019-11-15 RX ADMIN — LOSARTAN POTASSIUM 100 MILLIGRAM(S): 100 TABLET, FILM COATED ORAL at 05:58

## 2019-11-15 RX ADMIN — SIMETHICONE 80 MILLIGRAM(S): 80 TABLET, CHEWABLE ORAL at 17:12

## 2019-11-15 RX ADMIN — ALBUTEROL 1 PUFF(S): 90 AEROSOL, METERED ORAL at 13:57

## 2019-11-15 RX ADMIN — GABAPENTIN 300 MILLIGRAM(S): 400 CAPSULE ORAL at 05:59

## 2019-11-15 RX ADMIN — Medication 3 MILLILITER(S): at 06:34

## 2019-11-15 RX ADMIN — Medication 1 TABLET(S): at 05:58

## 2019-11-15 RX ADMIN — ENOXAPARIN SODIUM 40 MILLIGRAM(S): 100 INJECTION SUBCUTANEOUS at 11:36

## 2019-11-15 RX ADMIN — Medication 10: at 16:47

## 2019-11-15 RX ADMIN — ALBUTEROL 1 PUFF(S): 90 AEROSOL, METERED ORAL at 23:06

## 2019-11-15 RX ADMIN — PANTOPRAZOLE SODIUM 40 MILLIGRAM(S): 20 TABLET, DELAYED RELEASE ORAL at 06:03

## 2019-11-15 NOTE — PROGRESS NOTE ADULT - SUBJECTIVE AND OBJECTIVE BOX
INTERVAL HPI/OVERNIGHT EVENTS:  doing well. not using bipap. o2 sats on 2l nc 99%. steroids complete. on po abs    Vital Signs Last 24 Hrs  T(C): 36.8 (15 Nov 2019 17:23), Max: 37.1 (15 Nov 2019 11:15)  T(F): 98.2 (15 Nov 2019 17:23), Max: 98.8 (15 Nov 2019 11:15)  HR: 111 (15 Nov 2019 17:23) (69 - 128)  BP: 131/76 (15 Nov 2019 17:23) (131/76 - 151/78)  BP(mean): --  RR: 16 (15 Nov 2019 17:23) (16 - 19)  SpO2: 98% (15 Nov 2019 17:23) (93% - 100%)        PHYSICAL EXAM:  GEN:         Awake, responsive and comfortable.  HEENT:    Normal.    RESP:    mostly clear  CVS:             Regular rate and rhythm.   ABD:         Soft, non-tender, non-distended;   :             No costovertebral angle tenderness  EXTR:            No clubbing, cyanosis or edema  CNS:              Intact sensory and motor function.        MEDICATIONS  (STANDING):  ALBUTerol    90 MICROgram(s) HFA Inhaler 1 Puff(s) Inhalation every 4 hours  albuterol/ipratropium for Nebulization. 3 milliLiter(s) Nebulizer every 8 hours  amoxicillin  875 milliGRAM(s)/clavulanate 1 Tablet(s) Oral two times a day  ATENolol  Tablet 25 milliGRAM(s) Oral daily  dextrose 5%. 1000 milliLiter(s) (50 mL/Hr) IV Continuous <Continuous>  dextrose 50% Injectable 12.5 Gram(s) IV Push once  dextrose 50% Injectable 25 Gram(s) IV Push once  dextrose 50% Injectable 25 Gram(s) IV Push once  enoxaparin Injectable 40 milliGRAM(s) SubCutaneous daily  gabapentin 300 milliGRAM(s) Oral two times a day  insulin glargine Injectable (LANTUS) 10 Unit(s) SubCutaneous at bedtime  insulin lispro (HumaLOG) corrective regimen sliding scale   SubCutaneous three times a day before meals  insulin lispro (HumaLOG) corrective regimen sliding scale   SubCutaneous at bedtime  letrozole 2.5 milliGRAM(s) Oral daily  levothyroxine 137 MICROGram(s) Oral <User Schedule>  levothyroxine 150 MICROGram(s) Oral <User Schedule>  losartan 100 milliGRAM(s) Oral daily  pantoprazole    Tablet 40 milliGRAM(s) Oral before breakfast  predniSONE   Tablet 20 milliGRAM(s) Oral daily  simethicone 80 milliGRAM(s) Chew two times a day  tiotropium 18 MICROgram(s) Capsule 1 Capsule(s) Inhalation daily    MEDICATIONS  (PRN):  dextrose 40% Gel 15 Gram(s) Oral once PRN Blood Glucose LESS THAN 70 milliGRAM(s)/deciliter  glucagon  Injectable 1 milliGRAM(s) IntraMuscular once PRN Glucose LESS THAN 70 milligrams/deciliter        LABS:                        11.6   12.29 )-----------( 320      ( 15 Nov 2019 07:54 )             35.7     11-15    139  |  102  |  39<H>  ----------------------------<  345<H>  4.3   |  29  |  1.15    Ca    9.8      15 Nov 2019 07:54  Phos  3.7     11-14  Mg     2.0     11-15                RADIOLOGY & ADDITIONAL STUDIES:    ASSESSMENT: subcarinal mass with probable post obstructive pneumonic process  PLAN: stable for discharge. to check rm air sat. f/u lung mass at [facility]

## 2019-11-15 NOTE — CONSULT NOTE ADULT - PROBLEM SELECTOR RECOMMENDATION 9
recommend change dose to synthroid 150mcg 4x/wk and 137mcg 3d/wk and hae repeat tfts as outpt in 6wks  will f/u at [place] and with her endo

## 2019-11-15 NOTE — PROGRESS NOTE ADULT - SUBJECTIVE AND OBJECTIVE BOX
Pt is a 78 y/o female w/pmhx of HTN, HLD, DM2, Thyroid ca s/p surgeryx3  20+yrs ago, R lung ca s/p resection at [place], was there for f/u in July and found to have a possible new mass on R and is scheduled for Bronch on coming tuesday.  Pt reports over the last 2-3 days she has been noticing wheezing and gets a bout of cough and along w/that sob.  she is not bringing anything up with cough. States today she had a bout of coughing and couldnt catch her breath after and son called ems.  she had denied any fevers at home, but febrile in ed.  pt denies any sick contacts or travels, no cp, palpitations, n/v/d/c.  she had seen pmd few days ago for this and was started on inhalers and states felt a bit better but when gets cough gets the sob. (09 Nov 2019 23:56)  all events noted   feels great           Allergies    sulfa drugs (Unknown)    Intolerances        MEDICATIONS  (STANDING):  ALBUTerol    90 MICROgram(s) HFA Inhaler 1 Puff(s) Inhalation every 4 hours  albuterol/ipratropium for Nebulization. 3 milliLiter(s) Nebulizer every 8 hours  amoxicillin  875 milliGRAM(s)/clavulanate 1 Tablet(s) Oral two times a day  ATENolol  Tablet 25 milliGRAM(s) Oral daily  dextrose 5%. 1000 milliLiter(s) (50 mL/Hr) IV Continuous <Continuous>  dextrose 50% Injectable 12.5 Gram(s) IV Push once  dextrose 50% Injectable 25 Gram(s) IV Push once  dextrose 50% Injectable 25 Gram(s) IV Push once  enoxaparin Injectable 40 milliGRAM(s) SubCutaneous daily  gabapentin 300 milliGRAM(s) Oral two times a day  insulin glargine Injectable (LANTUS) 10 Unit(s) SubCutaneous at bedtime  insulin lispro (HumaLOG) corrective regimen sliding scale   SubCutaneous three times a day before meals  insulin lispro (HumaLOG) corrective regimen sliding scale   SubCutaneous at bedtime  letrozole 2.5 milliGRAM(s) Oral daily  levothyroxine 137 MICROGram(s) Oral <User Schedule>  levothyroxine 150 MICROGram(s) Oral <User Schedule>  losartan 100 milliGRAM(s) Oral daily  pantoprazole    Tablet 40 milliGRAM(s) Oral before breakfast  predniSONE   Tablet 20 milliGRAM(s) Oral daily  simethicone 80 milliGRAM(s) Chew two times a day  tiotropium 18 MICROgram(s) Capsule 1 Capsule(s) Inhalation daily    MEDICATIONS  (PRN):  dextrose 40% Gel 15 Gram(s) Oral once PRN Blood Glucose LESS THAN 70 milliGRAM(s)/deciliter  glucagon  Injectable 1 milliGRAM(s) IntraMuscular once PRN Glucose LESS THAN 70 milligrams/deciliter      REVIEW OF SYSTEMS:    breathing better       VITAL SIGNS:  T(C): 37.1 (11-15-19 @ 11:15), Max: 37.1 (11-15-19 @ 11:15)  T(F): 98.8 (11-15-19 @ 11:15), Max: 98.8 (11-15-19 @ 11:15)  HR: 128 (11-15-19 @ 13:33) (69 - 144)  BP: 135/77 (11-15-19 @ 11:15) (135/77 - 151/78)  RR: 19 (11-15-19 @ 11:15) (18 - 19)  SpO2: 98% (11-15-19 @ 13:33) (93% - 100%)  Wt(kg): --    PHYSICAL EXAM:    GENERAL: not in any distress  HEENT: Neck is supple, normocephalic, atraumatic   CHEST/LUNG: decreased breath sounds bilateral   HEART: Regular rate and rhythm; No murmurs, rubs, or gallops  ABDOMEN: Soft, Nontender, Nondistended; Bowel sounds present, no rebound   EXTREMITIES:  2+ Peripheral Pulses, No clubbing, cyanosis, or edema  SKIN: No rashes or lesions  BACK: no pressor sore   NERVOUS SYSTEM:  Alert & Oriented X3, Good concentration  PSYCH: normal affect     LABS:                         11.6   12.29 )-----------( 320      ( 15 Nov 2019 07:54 )             35.7     11-15    139  |  102  |  39<H>  ----------------------------<  345<H>  4.3   |  29  |  1.15    Ca    9.8      15 Nov 2019 07:54  Phos  3.7     11-14  Mg     2.0     11-15                  Thyroid Stimulating Hormone, Serum: <0.005 uIU/mL (11-14 @ 08:19)                Culture Results:   <10,000 CFU/mL Normal Urogenital Iram (11-10 @ 11:08)  Culture Results:   No growth at 5 days. (11-10 @ 00:46)  Culture Results:   No growth at 5 days. (11-10 @ 00:46)                Radiology:

## 2019-11-15 NOTE — CONSULT NOTE ADULT - ASSESSMENT
thyroid ca s/p thyroidectomy and MEJIA multiple times and hx of metastatic disease  tsh should be suppressed but not to this extent

## 2019-11-15 NOTE — PROGRESS NOTE ADULT - ASSESSMENT
80 y/o female with history of HTN, HLD, DM2, likely CKD III, Hypothyroidism & Thyroid ca s/p surgeryx3 20+yrs ago & mets to [place] lung [place] [facility] [place] for f/u in July and found to have a possible new mass on R lung who p/w  post obstructive PNA and COPD exacerbation.     Acute respiratory failure with hypoxia  - c/w PRN BiPA   - due to PNA w/ underlying lung mets  - O2 supplemented, monitor pulse oxymetry   - c/w Duoneb, Spiriva and increase solumedrol    - Patient reports dyspnea happens after food - GI recomended simethicone  w/ low fiber, lactose free diet    Pneumonia of right middle lobe  - due to other gram-negative/gram-positive/atypical bacteria  - CT chest showed a large subcarinal mass with bihilar lymph nodes, obstructive pneumonitis vs pneumonia in the right middle and lower lobes   - ID switched Zosyn and Azithro to Augmentin  - NGTD on blood cultures  - pulmonary and ID following - will switch to PO regimen as per ID    Postoperative hypothyroidism  - TSH is <0.005, and free T4 is 2  - endo changed synthroid from 150mcg Mon-Fri w/ 137 saturation & Sunday to 150mcg Monday to Thursday w/ 137mcg Friday to Sunday - patient supposedly had recently been too low when she was on 150mcg Mon-Sat and 75mg Sunday   - as per endo, will need to repeat TFTs as outpt in 6wks      Malignant neoplasm of right lung  - CT chest showed a large subcarinal mass with bihilar lymph nodes & a left hepatic mass  - patient will follow up with oncologist at [place] upon discharge w/ plans for bronchoscopy   - c/w Femara     Uncontrolled type 2 diabetes mellitus with hyperglycemia  - c/w ISS  - c/w gabapentin     HTN  - c/w losartan  - resume home atenolol as patient is also intermittently in sinus tach    Prophylaxis:  DVT: Lovenox  GI:  Protonix  PT: home w/ home PT    Dispo: Discharge in AM if HR is better.

## 2019-11-15 NOTE — PROGRESS NOTE ADULT - SUBJECTIVE AND OBJECTIVE BOX
78 y/o female with history of HTN, HLD, DM2, likely CKD III, Hypothyroidism & Thyroid ca s/p surgeryx3 20+yrs ago & mets to R lung status post Radiation at [facility] was there for f/u in July and found to have a possible new mass on R  who p/w  post obstructive PNA. She  is lying in bed in NAD.      MEDICATIONS  (STANDING):  ALBUTerol    90 MICROgram(s) HFA Inhaler 1 Puff(s) Inhalation every 4 hours  albuterol/ipratropium for Nebulization. 3 milliLiter(s) Nebulizer every 8 hours  amoxicillin  875 milliGRAM(s)/clavulanate 1 Tablet(s) Oral two times a day  ATENolol  Tablet 25 milliGRAM(s) Oral daily  dextrose 5%. 1000 milliLiter(s) (50 mL/Hr) IV Continuous <Continuous>  dextrose 50% Injectable 12.5 Gram(s) IV Push once  dextrose 50% Injectable 25 Gram(s) IV Push once  dextrose 50% Injectable 25 Gram(s) IV Push once  enoxaparin Injectable 40 milliGRAM(s) SubCutaneous daily  gabapentin 300 milliGRAM(s) Oral two times a day  insulin glargine Injectable (LANTUS) 10 Unit(s) SubCutaneous at bedtime  insulin lispro (HumaLOG) corrective regimen sliding scale   SubCutaneous three times a day before meals  insulin lispro (HumaLOG) corrective regimen sliding scale   SubCutaneous at bedtime  letrozole 2.5 milliGRAM(s) Oral daily  levothyroxine 137 MICROGram(s) Oral <User Schedule>  levothyroxine 150 MICROGram(s) Oral <User Schedule>  losartan 100 milliGRAM(s) Oral daily  pantoprazole    Tablet 40 milliGRAM(s) Oral before breakfast  predniSONE   Tablet 20 milliGRAM(s) Oral daily  simethicone 80 milliGRAM(s) Chew two times a day  tiotropium 18 MICROgram(s) Capsule 1 Capsule(s) Inhalation daily    MEDICATIONS  (PRN):  dextrose 40% Gel 15 Gram(s) Oral once PRN Blood Glucose LESS THAN 70 milliGRAM(s)/deciliter  glucagon  Injectable 1 milliGRAM(s) IntraMuscular once PRN Glucose LESS THAN 70 milligrams/deciliter      Allergies    sulfa drugs (Unknown)    Intolerances        Vital Signs Last 24 Hrs  T(C): 36.8 (15 Nov 2019 17:23), Max: 37.1 (15 Nov 2019 11:15)  T(F): 98.2 (15 Nov 2019 17:23), Max: 98.8 (15 Nov 2019 11:15)  HR: 111 (15 Nov 2019 17:23) (69 - 128)  BP: 131/76 (15 Nov 2019 17:23) (131/76 - 151/78)  BP(mean): --  RR: 16 (15 Nov 2019 17:23) (16 - 19)  SpO2: 98% (15 Nov 2019 17:23) (93% - 100%)    PHYSICAL EXAM:  GENERAL: NAD, well-groomed, well-developed  HEAD:  Atraumatic, Normocephalic  EYES: EOMI, PERRLA   NECK: Supple   NERVOUS SYSTEM:  Alert    CHEST/LUNG: Clear to auscultation bilaterally; No rales, rhonchi, wheezing, or rubs  HEART: Regular rate and rhythm; No murmurs, rubs, or gallops  ABDOMEN: Soft, Nontender, Nondistended; Bowel sounds present  EXTREMITIES:  No clubbing, cyanosis, or edema    LABS:                        11.6   12.29 )-----------( 320      ( 15 Nov 2019 07:54 )             35.7     11-15    139  |  102  |  39<H>  ----------------------------<  345<H>  4.3   |  29  |  1.15    Ca    9.8      15 Nov 2019 07:54  Phos  3.7     11-14  Mg     2.0     11-15          CAPILLARY BLOOD GLUCOSE      POCT Blood Glucose.: 361 mg/dL (15 Nov 2019 16:12)  POCT Blood Glucose.: 293 mg/dL (15 Nov 2019 11:02)  POCT Blood Glucose.: 323 mg/dL (15 Nov 2019 07:11)  POCT Blood Glucose.: 133 mg/dL (14 Nov 2019 22:04)      RADIOLOGY & ADDITIONAL TESTS:

## 2019-11-15 NOTE — PROGRESS NOTE ADULT - ASSESSMENT
78 y/o female w/pmhx of HTN, HLD, DM2, Thyroid ca s/p surgeryx3  20+yrs ago, R lung ca s/p resection at [place], was there for f/u in July and found to have a possible new mass on R and is scheduled for Bronch on coming tuesday.   has , likely obstructive pneumonia  on zosyn and zithromax for post obs pneumonia   FU pna work up  fu cultures   po switch   discharge plan 80 y/o female w/pmhx of HTN, HLD, DM2, Thyroid ca s/p surgeryx3  20+yrs ago, R lung ca s/p resection at [place], was there for f/u in July and found to have a possible new mass on R and is scheduled for Bronch on coming tuesday.   has , likely obstructive pneumonia  on zosyn and zithromax for post obs pneumonia   FU pna work up  fu cultures   po switch   discharge plan held today as tachycardic

## 2019-11-15 NOTE — CONSULT NOTE ADULT - SUBJECTIVE AND OBJECTIVE BOX
Patient is a 79y old  Female who presents with a chief complaint of sob (14 Nov 2019 20:22)      HPI:  Pt is a 80 y/o female w/pmhx of HTN, HLD, DM2, Thyroid ca s/p surgeryx3  20+yrs ago, R lung ca s/p resection at [place], was there for f/u in July and found to have a possible new mass on R and is scheduled for Bronch on coming tuesday.  Pt reports over the last 2-3 days she has been noticing wheezing and gets a bout of cough and along w/that sob.  she is not bringing anything up with cough. States today she had a bout of coughing and couldnt catch her breat after and son called ems.  she had denied any fevers at home, but febrile in ed.  pt denies any sick contacts or travels, no cp, palpitations, n/v/d/c.  she had seen pmd few days ago for this and was started on inhalers and states felt a bit better but when gets cough gets the sob. (09 Nov 2019 23:56)   endocrine called for abnormal tfts  per pt dx with thyroid nodules in 1994, then thyroid ca with recurrence and metastatic disease including mediastinum and lungs. has ahd multiple MEJIA tx as well and told could not get more, being followed by [facility] and by endo in [facility] Dr Calvert who recently increased her synthroid dose from 150mcg 6d/wk and 1/2tab on sundays to 150mcg 5d/wk adn 137mcg on sat/sunday  also told TG levels increasing further    PAST MEDICAL & SURGICAL HISTORY:  Diabetes type 2, uncontrolled  Essential hypertension  Lung cancer  Hypothyroidism  History of lung surgery  S/P thyroidectomy      Diabetes History:    FAMILY HISTORY:  No pertinent family history in first degree relatives        Social History:    Allergies    sulfa drugs (Unknown)    Intolerances        MEDICATIONS  (STANDING):  ALBUTerol    90 MICROgram(s) HFA Inhaler 1 Puff(s) Inhalation every 4 hours  albuterol/ipratropium for Nebulization. 3 milliLiter(s) Nebulizer every 8 hours  amoxicillin  875 milliGRAM(s)/clavulanate 1 Tablet(s) Oral two times a day  dextrose 5%. 1000 milliLiter(s) (50 mL/Hr) IV Continuous <Continuous>  dextrose 50% Injectable 12.5 Gram(s) IV Push once  dextrose 50% Injectable 25 Gram(s) IV Push once  dextrose 50% Injectable 25 Gram(s) IV Push once  enoxaparin Injectable 40 milliGRAM(s) SubCutaneous daily  gabapentin 300 milliGRAM(s) Oral two times a day  insulin glargine Injectable (LANTUS) 10 Unit(s) SubCutaneous at bedtime  insulin lispro (HumaLOG) corrective regimen sliding scale   SubCutaneous three times a day before meals  insulin lispro (HumaLOG) corrective regimen sliding scale   SubCutaneous at bedtime  letrozole 2.5 milliGRAM(s) Oral daily  levothyroxine 100 MICROGram(s) Oral daily  losartan 100 milliGRAM(s) Oral daily  pantoprazole    Tablet 40 milliGRAM(s) Oral before breakfast  predniSONE   Tablet 20 milliGRAM(s) Oral daily  simethicone 80 milliGRAM(s) Chew two times a day  tiotropium 18 MICROgram(s) Capsule 1 Capsule(s) Inhalation daily    MEDICATIONS  (PRN):  dextrose 40% Gel 15 Gram(s) Oral once PRN Blood Glucose LESS THAN 70 milliGRAM(s)/deciliter  glucagon  Injectable 1 milliGRAM(s) IntraMuscular once PRN Glucose LESS THAN 70 milligrams/deciliter      REVIEW OF SYSTEMS:  CONSTITUTIONAL:  as per HPI  HEENT:  Eyes:  No diplopia or blurred vision. ENT:  No earache, sore throat or runny nose.  CARDIOVASCULAR:  No pressure, squeezing, strangling, tightness, heaviness or aching about the chest, neck, axilla or epigastrium.  GASTROINTESTINAL:  No nausea, vomiting or diarrhea.  ENDOCRINE:  No heat or cold intolerance, polyuria or polydipsia. abnormal weight gain or loss, oral thrush  HEMATOLOGICAL:  No easy bruising or bleeding.     T(C): 37.1 (11-15-19 @ 11:15), Max: 37.1 (11-15-19 @ 11:15)  HR: 118 (11-15-19 @ 11:15) (69 - 144)  BP: 135/77 (11-15-19 @ 11:15) (135/77 - 151/78)  RR: 19 (11-15-19 @ 11:15) (18 - 19)  SpO2: 98% (11-15-19 @ 11:15) (93% - 100%)  Wt(kg): --    PHYSICAL EXAM:  GENERAL: NAD, well-groomed, well-developed  HEAD:  Atraumatic, Normocephalic  EYES:  conjunctiva and sclera clear  ENMT: No tonsillar erythema, exudates, or enlargement; Moist mucous membranes, Good dentition, No lesions  NECK: s/p thyroidectomy  CHEST/LUNG: Clear to percussion bilaterally; No rales, rhonchi, wheezing, or rubs  HEART: Regular rate and rhythm; No murmurs, rubs, or gallops  ABDOMEN: Soft, Nontender, Nondistended; Bowel sounds present      CAPILLARY BLOOD GLUCOSE      POCT Blood Glucose.: 293 mg/dL (15 Nov 2019 11:02)  POCT Blood Glucose.: 323 mg/dL (15 Nov 2019 07:11)  POCT Blood Glucose.: 133 mg/dL (14 Nov 2019 22:04)  POCT Blood Glucose.: 361 mg/dL (14 Nov 2019 16:59)                            11.6   12.29 )-----------( 320      ( 15 Nov 2019 07:54 )             35.7       CMP:  11-15 @ 07:54  SGPT --  Albumin --   Alk Phos --   Anion Gap 8   SGOT --   Total Bili --   BUN 39   Calcium Total 9.8   CO2 29   Chloride 102   Creatinine 1.15   eGFR if AA 53   eGFR if non AA 46   Glucose 345   Potassium 4.3   Protein --   Sodium 139      Thyroid Function Tests:  11-14 @ 10:36 TSH -- FreeT4 2.0 T3 -- Anti TPO -- Anti Thyroglobulin Ab -- TSI --  11-14 @ 08:19 TSH <0.005 FreeT4 -- T3 -- Anti TPO -- Anti Thyroglobulin Ab -- TSI --      Diabetes Tests:     Parathyroids:     Adrenals:       Radiology:

## 2019-11-16 DIAGNOSIS — E89.0 POSTPROCEDURAL HYPOTHYROIDISM: ICD-10-CM

## 2019-11-16 LAB
ANION GAP SERPL CALC-SCNC: 6 MMOL/L — SIGNIFICANT CHANGE UP (ref 5–17)
BUN SERPL-MCNC: 32 MG/DL — HIGH (ref 7–23)
CALCIUM SERPL-MCNC: 9.5 MG/DL — SIGNIFICANT CHANGE UP (ref 8.5–10.1)
CHLORIDE SERPL-SCNC: 104 MMOL/L — SIGNIFICANT CHANGE UP (ref 96–108)
CO2 SERPL-SCNC: 31 MMOL/L — SIGNIFICANT CHANGE UP (ref 22–31)
CREAT SERPL-MCNC: 1.19 MG/DL — SIGNIFICANT CHANGE UP (ref 0.5–1.3)
GLUCOSE SERPL-MCNC: 295 MG/DL — HIGH (ref 70–99)
HCT VFR BLD CALC: 35.3 % — SIGNIFICANT CHANGE UP (ref 34.5–45)
HGB BLD-MCNC: 11.5 G/DL — SIGNIFICANT CHANGE UP (ref 11.5–15.5)
MAGNESIUM SERPL-MCNC: 2 MG/DL — SIGNIFICANT CHANGE UP (ref 1.6–2.6)
MCHC RBC-ENTMCNC: 29.8 PG — SIGNIFICANT CHANGE UP (ref 27–34)
MCHC RBC-ENTMCNC: 32.6 GM/DL — SIGNIFICANT CHANGE UP (ref 32–36)
MCV RBC AUTO: 91.5 FL — SIGNIFICANT CHANGE UP (ref 80–100)
NRBC # BLD: 0 /100 WBCS — SIGNIFICANT CHANGE UP (ref 0–0)
PHOSPHATE SERPL-MCNC: 3 MG/DL — SIGNIFICANT CHANGE UP (ref 2.5–4.5)
PLATELET # BLD AUTO: 281 K/UL — SIGNIFICANT CHANGE UP (ref 150–400)
POTASSIUM SERPL-MCNC: 4.2 MMOL/L — SIGNIFICANT CHANGE UP (ref 3.5–5.3)
POTASSIUM SERPL-SCNC: 4.2 MMOL/L — SIGNIFICANT CHANGE UP (ref 3.5–5.3)
RBC # BLD: 3.86 M/UL — SIGNIFICANT CHANGE UP (ref 3.8–5.2)
RBC # FLD: 12.9 % — SIGNIFICANT CHANGE UP (ref 10.3–14.5)
SODIUM SERPL-SCNC: 141 MMOL/L — SIGNIFICANT CHANGE UP (ref 135–145)
WBC # BLD: 15.54 K/UL — HIGH (ref 3.8–10.5)
WBC # FLD AUTO: 15.54 K/UL — HIGH (ref 3.8–10.5)

## 2019-11-16 PROCEDURE — 99233 SBSQ HOSP IP/OBS HIGH 50: CPT

## 2019-11-16 PROCEDURE — 93010 ELECTROCARDIOGRAM REPORT: CPT

## 2019-11-16 RX ORDER — ALPRAZOLAM 0.25 MG
0.25 TABLET ORAL
Refills: 0 | Status: DISCONTINUED | OUTPATIENT
Start: 2019-11-16 | End: 2019-11-18

## 2019-11-16 RX ORDER — BUDESONIDE, MICRONIZED 100 %
0.5 POWDER (GRAM) MISCELLANEOUS
Refills: 0 | Status: DISCONTINUED | OUTPATIENT
Start: 2019-11-16 | End: 2019-11-18

## 2019-11-16 RX ORDER — ACETAMINOPHEN 500 MG
650 TABLET ORAL ONCE
Refills: 0 | Status: COMPLETED | OUTPATIENT
Start: 2019-11-16 | End: 2019-11-16

## 2019-11-16 RX ORDER — LEVOTHYROXINE SODIUM 125 MCG
137 TABLET ORAL DAILY
Refills: 0 | Status: DISCONTINUED | OUTPATIENT
Start: 2019-11-16 | End: 2019-11-18

## 2019-11-16 RX ORDER — BUDESONIDE, MICRONIZED 100 %
0.5 POWDER (GRAM) MISCELLANEOUS ONCE
Refills: 0 | Status: COMPLETED | OUTPATIENT
Start: 2019-11-16 | End: 2019-11-16

## 2019-11-16 RX ADMIN — Medication 10 MILLIGRAM(S): at 05:55

## 2019-11-16 RX ADMIN — ALBUTEROL 1 PUFF(S): 90 AEROSOL, METERED ORAL at 13:42

## 2019-11-16 RX ADMIN — Medication 3 MILLILITER(S): at 17:07

## 2019-11-16 RX ADMIN — Medication 4: at 12:04

## 2019-11-16 RX ADMIN — GABAPENTIN 300 MILLIGRAM(S): 400 CAPSULE ORAL at 17:26

## 2019-11-16 RX ADMIN — Medication 1 TABLET(S): at 17:26

## 2019-11-16 RX ADMIN — Medication 650 MILLIGRAM(S): at 10:36

## 2019-11-16 RX ADMIN — ALBUTEROL 1 PUFF(S): 90 AEROSOL, METERED ORAL at 17:25

## 2019-11-16 RX ADMIN — SIMETHICONE 80 MILLIGRAM(S): 80 TABLET, CHEWABLE ORAL at 05:56

## 2019-11-16 RX ADMIN — INSULIN GLARGINE 10 UNIT(S): 100 INJECTION, SOLUTION SUBCUTANEOUS at 23:38

## 2019-11-16 RX ADMIN — ENOXAPARIN SODIUM 40 MILLIGRAM(S): 100 INJECTION SUBCUTANEOUS at 17:24

## 2019-11-16 RX ADMIN — Medication 20 MILLIGRAM(S): at 23:44

## 2019-11-16 RX ADMIN — Medication 8: at 09:12

## 2019-11-16 RX ADMIN — Medication 3 MILLILITER(S): at 21:56

## 2019-11-16 RX ADMIN — SIMETHICONE 80 MILLIGRAM(S): 80 TABLET, CHEWABLE ORAL at 17:26

## 2019-11-16 RX ADMIN — Medication 650 MILLIGRAM(S): at 12:05

## 2019-11-16 RX ADMIN — Medication 137 MICROGRAM(S): at 07:00

## 2019-11-16 RX ADMIN — GABAPENTIN 300 MILLIGRAM(S): 400 CAPSULE ORAL at 05:53

## 2019-11-16 RX ADMIN — Medication 1 TABLET(S): at 05:52

## 2019-11-16 RX ADMIN — ALBUTEROL 1 PUFF(S): 90 AEROSOL, METERED ORAL at 23:38

## 2019-11-16 RX ADMIN — PANTOPRAZOLE SODIUM 40 MILLIGRAM(S): 20 TABLET, DELAYED RELEASE ORAL at 06:58

## 2019-11-16 RX ADMIN — LOSARTAN POTASSIUM 100 MILLIGRAM(S): 100 TABLET, FILM COATED ORAL at 05:55

## 2019-11-16 RX ADMIN — Medication 3 MILLILITER(S): at 05:27

## 2019-11-16 RX ADMIN — ALBUTEROL 1 PUFF(S): 90 AEROSOL, METERED ORAL at 05:52

## 2019-11-16 RX ADMIN — ATENOLOL 25 MILLIGRAM(S): 25 TABLET ORAL at 05:52

## 2019-11-16 RX ADMIN — Medication 0.5 MILLIGRAM(S): at 22:07

## 2019-11-16 RX ADMIN — LETROZOLE 2.5 MILLIGRAM(S): 2.5 TABLET, FILM COATED ORAL at 17:24

## 2019-11-16 RX ADMIN — Medication 4: at 17:23

## 2019-11-16 NOTE — DIETITIAN INITIAL EVALUATION ADULT. - PERTINENT LABORATORY DATA
11-16 Na141 mmol/L Glu 295 mg/dL<H> K+ 4.2 mmol/L Cr  1.19 mg/dL BUN 32 mg/dL<H> 11-16 Phos 3.0 mg/dL 11-11 Alb 3.1 g/dL<L> 11-15 [id number] 8.4 %<H>11-11 ALT 34 U/L AST 17 U/L Alkaline Phosphatase 148 U/L<H> 11-11 Alb 3.1 g/dL <L>

## 2019-11-16 NOTE — PROGRESS NOTE ADULT - ASSESSMENT
78 y/o female w/pmhx of HTN, HLD, DM2, Thyroid ca s/p surgeryx3  20+yrs ago, R lung ca s/p resection at [place], was there for f/u in July and found to have a possible new mass on R and is scheduled for Bronch on coming tuesday.   has , likely obstructive pneumonia  discussed with PMD today   high wbc ? steroids  as per nursing just got comfortable   was in a chair earlier and aleja a RRT   discharge plan held today as tachycardic and high wbc  just saw dr robertson intervention   discussed with nurse by bedside   will follow; procal is relatively low

## 2019-11-16 NOTE — DIETITIAN INITIAL EVALUATION ADULT. - ENERGY NEEDS
Height (cm): 167.64 (11-09)  Weight (kg): 77.7 (11-10)  BMI (kg/m2): 27.6 (11-10)  IBW: 58.9kg          % IBW:  131%           UBW:  78.4 kg           %UBW: 102%

## 2019-11-16 NOTE — PROGRESS NOTE ADULT - SUBJECTIVE AND OBJECTIVE BOX
INTERVAL HPI/OVERNIGHT EVENTS:  episodes of BOOKER in am. placed on Bipap with improvement    Vital Signs Last 24 Hrs  T(C): 36.6 (16 Nov 2019 17:40), Max: 36.9 (16 Nov 2019 10:45)  T(F): 97.9 (16 Nov 2019 17:40), Max: 98.5 (16 Nov 2019 10:45)  HR: 90 (16 Nov 2019 17:40) (82 - 102)  BP: 141/69 (16 Nov 2019 17:40) (108/50 - 141/69)  BP(mean): --  RR: 17 (16 Nov 2019 17:40) (16 - 18)  SpO2: 100% (16 Nov 2019 17:40) (96% - 100%)        PHYSICAL EXAM:  GEN:         Awake, responsive and comfortable at rest  HEENT:    Normal.    RESP:        bilat BS, scattered rhonchi  CVS:             Regular rate and rhythm.   ABD:         Soft, non-tender, non-distended;   :             No costovertebral angle tenderness  EXTR:            No clubbing, cyanosis or edema          MEDICATIONS  (STANDING):  ALBUTerol    90 MICROgram(s) HFA Inhaler 1 Puff(s) Inhalation every 4 hours  albuterol/ipratropium for Nebulization. 3 milliLiter(s) Nebulizer every 8 hours  amoxicillin  875 milliGRAM(s)/clavulanate 1 Tablet(s) Oral two times a day  ATENolol  Tablet 25 milliGRAM(s) Oral daily  dextrose 5%. 1000 milliLiter(s) (50 mL/Hr) IV Continuous <Continuous>  dextrose 50% Injectable 12.5 Gram(s) IV Push once  dextrose 50% Injectable 25 Gram(s) IV Push once  dextrose 50% Injectable 25 Gram(s) IV Push once  enoxaparin Injectable 40 milliGRAM(s) SubCutaneous daily  gabapentin 300 milliGRAM(s) Oral two times a day  insulin glargine Injectable (LANTUS) 10 Unit(s) SubCutaneous at bedtime  insulin lispro (HumaLOG) corrective regimen sliding scale   SubCutaneous three times a day before meals  insulin lispro (HumaLOG) corrective regimen sliding scale   SubCutaneous at bedtime  letrozole 2.5 milliGRAM(s) Oral daily  levothyroxine 137 MICROGram(s) Oral daily  losartan 100 milliGRAM(s) Oral daily  pantoprazole    Tablet 40 milliGRAM(s) Oral before breakfast  predniSONE   Tablet 10 milliGRAM(s) Oral daily  simethicone 80 milliGRAM(s) Chew two times a day  tiotropium 18 MICROgram(s) Capsule 1 Capsule(s) Inhalation daily    MEDICATIONS  (PRN):  dextrose 40% Gel 15 Gram(s) Oral once PRN Blood Glucose LESS THAN 70 milliGRAM(s)/deciliter  glucagon  Injectable 1 milliGRAM(s) IntraMuscular once PRN Glucose LESS THAN 70 milligrams/deciliter        LABS:                        11.5   15.54 )-----------( 281      ( 16 Nov 2019 07:54 )             35.3     11-16    141  |  104  |  32<H>  ----------------------------<  295<H>  4.2   |  31  |  1.19    Ca    9.5      16 Nov 2019 07:54  Phos  3.0     11-16  Mg     2.0     11-16                RADIOLOGY & ADDITIONAL STUDIES:    ASSESSMENT: OAD, lung mass, DM  PLAN: agree with low dose prednisone, add budesonide 0.5 mg bid.

## 2019-11-16 NOTE — PROGRESS NOTE ADULT - SUBJECTIVE AND OBJECTIVE BOX
Patient is a 79y old  Female who presents with a chief complaint of sob (15 Nov 2019 20:14)      Interval History: currently on levothyroxine 150 mcg, 4/7 days and 137 mcg , 3/7 days. TSH too suppressed earlier    MEDICATIONS  (STANDING):  ALBUTerol    90 MICROgram(s) HFA Inhaler 1 Puff(s) Inhalation every 4 hours  albuterol/ipratropium for Nebulization. 3 milliLiter(s) Nebulizer every 8 hours  amoxicillin  875 milliGRAM(s)/clavulanate 1 Tablet(s) Oral two times a day  ATENolol  Tablet 25 milliGRAM(s) Oral daily  dextrose 5%. 1000 milliLiter(s) (50 mL/Hr) IV Continuous <Continuous>  dextrose 50% Injectable 12.5 Gram(s) IV Push once  dextrose 50% Injectable 25 Gram(s) IV Push once  dextrose 50% Injectable 25 Gram(s) IV Push once  enoxaparin Injectable 40 milliGRAM(s) SubCutaneous daily  gabapentin 300 milliGRAM(s) Oral two times a day  insulin glargine Injectable (LANTUS) 10 Unit(s) SubCutaneous at bedtime  insulin lispro (HumaLOG) corrective regimen sliding scale   SubCutaneous three times a day before meals  insulin lispro (HumaLOG) corrective regimen sliding scale   SubCutaneous at bedtime  letrozole 2.5 milliGRAM(s) Oral daily  levothyroxine 137 MICROGram(s) Oral <User Schedule>  levothyroxine 150 MICROGram(s) Oral <User Schedule>  losartan 100 milliGRAM(s) Oral daily  pantoprazole    Tablet 40 milliGRAM(s) Oral before breakfast  predniSONE   Tablet 10 milliGRAM(s) Oral daily  simethicone 80 milliGRAM(s) Chew two times a day  tiotropium 18 MICROgram(s) Capsule 1 Capsule(s) Inhalation daily    MEDICATIONS  (PRN):  dextrose 40% Gel 15 Gram(s) Oral once PRN Blood Glucose LESS THAN 70 milliGRAM(s)/deciliter  glucagon  Injectable 1 milliGRAM(s) IntraMuscular once PRN Glucose LESS THAN 70 milligrams/deciliter      Allergies    sulfa drugs (Unknown)    Intolerances        REVIEW OF SYSTEMS:  CONSTITUTIONAL: no changes  EYES: No eye pain, visual disturbances, or discharge  ENMT:  No difficulty hearing, No sinus or throat pain  NECK: No pain or stiffness  RESPIRATORY: No cough, wheezing, chills or hemoptysis; No shortness of breath  CARDIOVASCULAR: No chest pain, palpitations or leg swelling  GASTROINTESTINAL: No abdominal or epigastric pain. No nausea, vomiting, or hematemesis; No diarrhea or constipation. No melena or hematochezia.  GENITOURINARY: No dysuria, frequency, hematuria, or incontinence  NEUROLOGICAL: No headaches, memory loss, loss of strength, numbness, or tremors  SKIN: No itching, burning, rashes, or lesions   ENDOCRINE: No heat or cold intolerance; No hair loss  MUSCULOSKELETAL: No joint pain or swelling; No muscle, back, or extremity pain  PSYCHIATRIC: No depression, anxiety, mood swings, or difficulty sleeping  HEME/LYMPH: No easy bruising, or bleeding gums  ALLERY AND IMMUNOLOGIC: No hives or eczema    Vital Signs Last 24 Hrs  T(C): 36.9 (16 Nov 2019 10:45), Max: 36.9 (16 Nov 2019 10:45)  T(F): 98.5 (16 Nov 2019 10:45), Max: 98.5 (16 Nov 2019 10:45)  HR: 85 (16 Nov 2019 10:45) (82 - 111)  BP: 133/67 (16 Nov 2019 10:45) (108/50 - 133/67)  BP(mean): --  RR: 16 (16 Nov 2019 10:45) (16 - 18)  SpO2: 99% (16 Nov 2019 10:45) (96% - 99%)    PHYSICAL EXAM:  GENERAL:   HEAD: Atraumatic, Normocephalic  EYES: PERRLA, conjunctiva and sclera clear  ENMT: No tonsillar erythema, exudates, or enlargement; Moist mucous membranes, Good dentition, No lesions  NECK: Supple, No JVD, Normal thyroid  NERVOUS SYSTEM:  Alert & Oriented X3, Good concentration; Motor Strength 5/5 B/L upper and lower extremities  CHEST/LUNG: Clear to auscultaion bilaterally; No rales, rhonchi, wheezing, or rubs  HEART: Regular rate and rhythm; No murmurs, rubs, or gallops  ABDOMEN: Soft, Nontender, Nondistended; Bowel sounds present  EXTREMITIES:  2+ Peripheral Pulses, no edema  SKIN: No rashes or lesions    LABS:        CAPILLARY BLOOD GLUCOSE      POCT Blood Glucose.: 237 mg/dL (16 Nov 2019 11:52)  POCT Blood Glucose.: 324 mg/dL (16 Nov 2019 08:47)  POCT Blood Glucose.: 169 mg/dL (15 Nov 2019 23:03)    Lipid panel:           Thyroid:  Diabetes Tests:11-15 @ 20:34 HbA1c 8.4 C-Peptide --    Parathyroid Panel:  Adrenals:  RADIOLOGY & ADDITIONAL TESTS:    Imaging Personally Reviewed:  [ ] YES  [ ] NO    Consultant(s) Notes Reviewed:  [ ] YES  [ ] NO    Care Discussed with Consultants/Other Providers [ ] YES  [ ] NO

## 2019-11-16 NOTE — DIETITIAN INITIAL EVALUATION ADULT. - PHYSICAL APPEARANCE
other (specify)/BMI=27.7(11/10), no edema noted Nutrition focused physical exam conducted; Subcutaneous fat Exam;  [ Mild  ]  Orbital fat pads region,  [ Mild  ]Buccal fat region,  [  WNL ]triceps region, [ WNL  ]ribs region.  Muscle Exam; [ Mild  ]temples region, [ Mild  ]clavicle region, [  Mild ]shoulder region, [ WNL   ]Scapula region, [ Moderate  ]Interosseous region, [  WNL ]thigh region, [ WNL  ]Calf region

## 2019-11-16 NOTE — CHART NOTE - NSCHARTNOTEFT_GEN_A_CORE
Upon Nutritional Assessment by the Registered Dietitian your patient was determined to meet criteria / has evidence of the following diagnosis/diagnoses:          [ ]  Mild Protein Calorie Malnutrition        [x ]  Moderate Protein Calorie Malnutrition( acute)        [ ] Severe Protein Calorie Malnutrition        [ ] Unspecified Protein Calorie Malnutrition        [ ] Underweight / BMI <19        [ ] Morbid Obesity / BMI > 40      Findings as based on:  •  Comprehensive nutrition assessment and consultation  •  Calorie counts (nutrient intake analysis)  •  Food acceptance and intake status from observations by staff  •  Follow up  •  Patient education  •  Intervention secondary to interdisciplinary rounds  •   concerns      Treatment:    The following diet has been recommended:  Low sodium, consistent carbohydrate c evening snack , low fiber,  Glucerna Shake 2 x daily= 440 calories, 20 grams protein daily , lactose restricted     PROVIDER Section:     By signing this assessment you are acknowledging and agree with the diagnosis/diagnoses assigned by the Registered Dietitian    Comments: Upon Nutritional Assessment by the Registered Dietitian your patient was determined to meet criteria / has evidence of the following diagnosis/diagnoses:          [ ]  Mild Protein Calorie Malnutrition        [x ]  Moderate Protein Calorie Malnutrition( acute)        [ ] Severe Protein Calorie Malnutrition        [ ] Unspecified Protein Calorie Malnutrition        [ ] Underweight / BMI <19        [ ] Morbid Obesity / BMI > 40      Findings as based on:  •  Comprehensive nutrition assessment and consultation  •  Calorie counts (nutrient intake analysis)  •  Food acceptance and intake status from observations by staff  •  Follow up  •  Patient education  •  Intervention secondary to interdisciplinary rounds  •   concerns      Treatment:    The following diet has been recommended:  Low sodium, consistent carbohydrate c evening snack , low fiber, Dysphagia 3 Soft - Thin Liquids, Glucerna Shake 2 x daily= 440 calories, 20 grams protein daily , lactose restricted     PROVIDER Section:     By signing this assessment you are acknowledging and agree with the diagnosis/diagnoses assigned by the Registered Dietitian    Comments:

## 2019-11-16 NOTE — DIETITIAN INITIAL EVALUATION ADULT. - OTHER INFO
Pt reports good appetite in general, c wt. gain due to prednisone.  Pt did own shopping/cooking PTA.  Diet PTA: Low sodium , no sugar, concentrated sweets, low CHO.   Pt reports to be tolerating soft consistency c some swallowing difficulty @ times.  Pt c own teeth, able to masticate w/o difficulty.    Pt was on Novolog 20 units 2 x day.  Pt reported high glucose at night c low glucose early in the morning.  Diet education for meal planning c plate method, low fiber diet explained c written instruction.  Pt was able to teach back.  Elderly blood glucose goal 100-200 mg/dL explained.  Pt is being followed by Endocrinologist.

## 2019-11-16 NOTE — PROGRESS NOTE ADULT - PROBLEM SELECTOR PLAN 1
continue with lowered dose of levothyroxine   Check thyroid function tests in a few days especially free T4 as The recovery of TSH may lag behind time wise compared to  thyroid hormones like T4 and T3

## 2019-11-16 NOTE — DIETITIAN INITIAL EVALUATION ADULT. - DIET TYPE
dysphagia 3, soft, thin liquids/+  Glucerna Shake 2 x daily= 440 calories, 20 grams protein daily/low sodium/fiber/residue restricted fiber/residue restricted/low sodium/lactose restricted +  Glucerna Shake 2 x daily= 440 calories, 20 grams protein daily/dysphagia 3, soft, thin liquids

## 2019-11-16 NOTE — DIETITIAN INITIAL EVALUATION ADULT. - FACTORS AFF FOOD INTAKE
food dislike, gas pain, relieved c burping, 11/14, swallow evaluation recommended dysphagia 3(which is soft), mechanical soft c thin fluids/other (specify)/difficulty swallowing

## 2019-11-16 NOTE — DIETITIAN INITIAL EVALUATION ADULT. - PERTINENT MEDS FT
MEDICATIONS  (STANDING):  ALBUTerol    90 MICROgram(s) HFA Inhaler 1 Puff(s) Inhalation every 4 hours  albuterol/ipratropium for Nebulization. 3 milliLiter(s) Nebulizer every 8 hours  amoxicillin  875 milliGRAM(s)/clavulanate 1 Tablet(s) Oral two times a day  ATENolol  Tablet 25 milliGRAM(s) Oral daily  dextrose 5%. 1000 milliLiter(s) (50 mL/Hr) IV Continuous <Continuous>  dextrose 50% Injectable 12.5 Gram(s) IV Push once  dextrose 50% Injectable 25 Gram(s) IV Push once  dextrose 50% Injectable 25 Gram(s) IV Push once  enoxaparin Injectable 40 milliGRAM(s) SubCutaneous daily  gabapentin 300 milliGRAM(s) Oral two times a day  insulin glargine Injectable (LANTUS) 10 Unit(s) SubCutaneous at bedtime  insulin lispro (HumaLOG) corrective regimen sliding scale   SubCutaneous three times a day before meals  insulin lispro (HumaLOG) corrective regimen sliding scale   SubCutaneous at bedtime  letrozole 2.5 milliGRAM(s) Oral daily  levothyroxine 137 MICROGram(s) Oral <User Schedule>  levothyroxine 150 MICROGram(s) Oral <User Schedule>  losartan 100 milliGRAM(s) Oral daily  pantoprazole    Tablet 40 milliGRAM(s) Oral before breakfast  predniSONE   Tablet 10 milliGRAM(s) Oral daily  simethicone 80 milliGRAM(s) Chew two times a day  tiotropium 18 MICROgram(s) Capsule 1 Capsule(s) Inhalation daily

## 2019-11-16 NOTE — PROGRESS NOTE ADULT - SUBJECTIVE AND OBJECTIVE BOX
HPI:  Pt is a 78 y/o female w/pmhx of HTN, HLD, DM2, Thyroid ca s/p surgeryx3  20+yrs ago, R lung ca s/p resection at [place], was there for f/u in July and found to have a possible new mass on R and is scheduled for Bronch on coming tuesday.  Pt reports over the last 2-3 days she has been noticing wheezing and gets a bout of cough and along w/that sob.  she is not bringing anything up with cough. States today she had a bout of coughing and couldnt catch her breat after and son called ems.  she had denied any fevers at home, but febrile in ed.  pt denies any sick contacts or travels, no cp, palpitations, n/v/d/c.  she had seen pmd few days ago for this and was started on inhalers and states felt a bit better but when gets cough gets the sob. (09 Nov 2019 23:56)      Allergies    sulfa drugs (Unknown)    Intolerances        MEDICATIONS  (STANDING):  ALBUTerol    90 MICROgram(s) HFA Inhaler 1 Puff(s) Inhalation every 4 hours  albuterol/ipratropium for Nebulization. 3 milliLiter(s) Nebulizer every 8 hours  amoxicillin  875 milliGRAM(s)/clavulanate 1 Tablet(s) Oral two times a day  ATENolol  Tablet 25 milliGRAM(s) Oral daily  buDESOnide    Inhalation Suspension 0.5 milliGRAM(s) Inhalation two times a day  dextrose 5%. 1000 milliLiter(s) (50 mL/Hr) IV Continuous <Continuous>  dextrose 50% Injectable 12.5 Gram(s) IV Push once  dextrose 50% Injectable 25 Gram(s) IV Push once  dextrose 50% Injectable 25 Gram(s) IV Push once  enoxaparin Injectable 40 milliGRAM(s) SubCutaneous daily  gabapentin 300 milliGRAM(s) Oral two times a day  insulin glargine Injectable (LANTUS) 10 Unit(s) SubCutaneous at bedtime  insulin lispro (HumaLOG) corrective regimen sliding scale   SubCutaneous three times a day before meals  insulin lispro (HumaLOG) corrective regimen sliding scale   SubCutaneous at bedtime  letrozole 2.5 milliGRAM(s) Oral daily  levothyroxine 137 MICROGram(s) Oral daily  losartan 100 milliGRAM(s) Oral daily  methylPREDNISolone sodium succinate Injectable 20 milliGRAM(s) IV Push two times a day  pantoprazole    Tablet 40 milliGRAM(s) Oral before breakfast  simethicone 80 milliGRAM(s) Chew two times a day  tiotropium 18 MICROgram(s) Capsule 1 Capsule(s) Inhalation daily    MEDICATIONS  (PRN):  ALPRAZolam 0.25 milliGRAM(s) Oral two times a day PRN anxiety  dextrose 40% Gel 15 Gram(s) Oral once PRN Blood Glucose LESS THAN 70 milliGRAM(s)/deciliter  glucagon  Injectable 1 milliGRAM(s) IntraMuscular once PRN Glucose LESS THAN 70 milligrams/deciliter      REVIEW OF SYSTEMS:    ongoing complaints   she eats   lies down burps and that triggers her breathing issues    VITAL SIGNS:  T(C): 36.4 (11-17-19 @ 00:51), Max: 36.9 (11-16-19 @ 10:45)  T(F): 97.5 (11-17-19 @ 00:51), Max: 98.5 (11-16-19 @ 10:45)  HR: 73 (11-17-19 @ 01:02) (73 - 102)  BP: 105/53 (11-17-19 @ 00:51) (105/53 - 141/69)  RR: 16 (11-17-19 @ 00:51) (16 - 18)  SpO2: 95% (11-17-19 @ 01:02) (94% - 100%)  Wt(kg): --    PHYSICAL EXAM:    GENERAL: not in any distress  on nasal cannula   100 % o2  HEENT: Neck is supple, normocephalic, atraumatic   CHEST/LUNG: Clear to auscultation bilaterally;   occ rhonchi  tachycardic  HEART: Regular rate and rhythm;   ABDOMEN: Soft, Nontender, Nondistended; Bowel sounds present, no rebound   EXTREMITIES:  2+ Peripheral Pulses, No clubbing, cyanosis, or edema  SKIN: No rashes or lesions  BACK: no pressor sore   NERVOUS SYSTEM:  Alert & Oriented X3, Good concentration  PSYCH: normal affect     LABS:                         11.5   15.54 )-----------( 281      ( 16 Nov 2019 07:54 )             35.3     11-16    141  |  104  |  32<H>  ----------------------------<  295<H>  4.2   |  31  |  1.19    Ca    9.5      16 Nov 2019 07:54  Phos  3.0     11-16  Mg     2.0     11-16                  Thyroid Stimulating Hormone, Serum: <0.005 uIU/mL (11-14 @ 08:19)                Culture Results:   <10,000 CFU/mL Normal Urogenital Iram (11-10 @ 11:08)                Radiology:

## 2019-11-16 NOTE — PROGRESS NOTE ADULT - ASSESSMENT
80 y/o female with history of HTN, HLD, DM2, likely CKD III, Hypothyroidism & Thyroid ca s/p surgeryx3 20+yrs ago & mets to R lung status post Radiation at [facility] [place] there for f/u in July and found to have a possible new mass on R lung who p/w  post obstructive PNA and COPD exacerbation.     Acute respiratory failure with hypoxia  - c/w PRN BiPA   - due to PNA w/ underlying lung mets  - O2 supplemented, monitor pulse oxymetry   - c/w Duoneb, Spiriva and increase solumedrol    - Patient reports dyspnea happens after food - GI recomended simethicone  w/ low fiber, lactose free diet    Pneumonia of right middle lobe  - due to other gram-negative/gram-positive/atypical bacteria  - CT chest showed a large subcarinal mass with bihilar lymph nodes, obstructive pneumonitis vs pneumonia in the right middle and lower lobes   - ID switched Zosyn and Azithro to Augmentin  - NGTD on blood cultures  - pulmonary and ID following - will switch to PO regimen as per ID    Postoperative hypothyroidism  - TSH is <0.005, and free T4 is 2  - as per my conversation w/ Dr. Storey, pt's synthroid has been changed from 150mcg Mon-Fri w/ 137 saturation & Sunday to 137mcg daily  - as per endo, will need to repeat TFTs as outpt in 6wks    Malignant neoplasm of right lung  - CT chest showed a large subcarinal mass with bihilar lymph nodes & a left hepatic mass  - patient will follow up with oncologist at [place] upon discharge w/ plans for bronchoscopy   - c/w Femara     Uncontrolled type 2 diabetes mellitus with hyperglycemia  - c/w ISS  - c/w gabapentin     HTN  - c/w losartan  - resume home atenolol as patient is also intermittently in sinus tach    Prophylaxis:  DVT: Lovenox  GI:  Protonix  PT: home w/ home PT    Dispo: Pt had another episode of dyspnea requiring BiPAP today. I spoke w/ Dr. Rapp, who recommended the patient be transferred to [facility] [place] for bronch.

## 2019-11-16 NOTE — PROGRESS NOTE ADULT - SUBJECTIVE AND OBJECTIVE BOX
78 y/o female with history of HTN, HLD, DM2, likely CKD III, Hypothyroidism & Thyroid ca s/p surgeryx3 20+yrs ago & mets to R lung status post Radiation at [facility] [place] was there for f/u in July and found to have a possible new mass on R  who p/w  post obstructive PNA. She  is lying in bed in NAD.      MEDICATIONS  (STANDING):  ALBUTerol    90 MICROgram(s) HFA Inhaler 1 Puff(s) Inhalation every 4 hours  albuterol/ipratropium for Nebulization. 3 milliLiter(s) Nebulizer every 8 hours  amoxicillin  875 milliGRAM(s)/clavulanate 1 Tablet(s) Oral two times a day  ATENolol  Tablet 25 milliGRAM(s) Oral daily  dextrose 5%. 1000 milliLiter(s) (50 mL/Hr) IV Continuous <Continuous>  dextrose 50% Injectable 12.5 Gram(s) IV Push once  dextrose 50% Injectable 25 Gram(s) IV Push once  dextrose 50% Injectable 25 Gram(s) IV Push once  enoxaparin Injectable 40 milliGRAM(s) SubCutaneous daily  gabapentin 300 milliGRAM(s) Oral two times a day  insulin glargine Injectable (LANTUS) 10 Unit(s) SubCutaneous at bedtime  insulin lispro (HumaLOG) corrective regimen sliding scale   SubCutaneous three times a day before meals  insulin lispro (HumaLOG) corrective regimen sliding scale   SubCutaneous at bedtime  letrozole 2.5 milliGRAM(s) Oral daily  levothyroxine 137 MICROGram(s) Oral daily  losartan 100 milliGRAM(s) Oral daily  pantoprazole    Tablet 40 milliGRAM(s) Oral before breakfast  predniSONE   Tablet 10 milliGRAM(s) Oral daily  simethicone 80 milliGRAM(s) Chew two times a day  tiotropium 18 MICROgram(s) Capsule 1 Capsule(s) Inhalation daily    MEDICATIONS  (PRN):  dextrose 40% Gel 15 Gram(s) Oral once PRN Blood Glucose LESS THAN 70 milliGRAM(s)/deciliter  glucagon  Injectable 1 milliGRAM(s) IntraMuscular once PRN Glucose LESS THAN 70 milligrams/deciliter      Allergies    sulfa drugs (Unknown)    Intolerances        Vital Signs Last 24 Hrs  T(C): 36.6 (16 Nov 2019 17:40), Max: 36.9 (16 Nov 2019 10:45)  T(F): 97.9 (16 Nov 2019 17:40), Max: 98.5 (16 Nov 2019 10:45)  HR: 90 (16 Nov 2019 17:40) (82 - 102)  BP: 141/69 (16 Nov 2019 17:40) (108/50 - 141/69)  BP(mean): --  RR: 17 (16 Nov 2019 17:40) (16 - 18)  SpO2: 100% (16 Nov 2019 17:40) (96% - 100%)    PHYSICAL EXAM:  GENERAL: NAD, well-groomed, well-developed  HEAD:  Atraumatic, Normocephalic  EYES: EOMI, PERRLA   NECK: Supple   NERVOUS SYSTEM:  Alert    CHEST/LUNG: Clear to auscultation bilaterally; No rales, rhonchi, wheezing, or rubs  HEART: Regular rate and rhythm; No murmurs, rubs, or gallops  ABDOMEN: Soft, Nontender, Nondistended; Bowel sounds present  EXTREMITIES:  No clubbing, cyanosis, or edema    LABS:                        11.5   15.54 )-----------( 281      ( 16 Nov 2019 07:54 )             35.3     11-16    141  |  104  |  32<H>  ----------------------------<  295<H>  4.2   |  31  |  1.19    Ca    9.5      16 Nov 2019 07:54  Phos  3.0     11-16  Mg     2.0     11-16          CAPILLARY BLOOD GLUCOSE      POCT Blood Glucose.: 208 mg/dL (16 Nov 2019 17:08)  POCT Blood Glucose.: 434 mg/dL (16 Nov 2019 17:00)  POCT Blood Glucose.: 237 mg/dL (16 Nov 2019 11:52)  POCT Blood Glucose.: 324 mg/dL (16 Nov 2019 08:47)  POCT Blood Glucose.: 169 mg/dL (15 Nov 2019 23:03)      RADIOLOGY & ADDITIONAL TESTS:

## 2019-11-17 LAB
ANION GAP SERPL CALC-SCNC: 8 MMOL/L — SIGNIFICANT CHANGE UP (ref 5–17)
BUN SERPL-MCNC: 29 MG/DL — HIGH (ref 7–23)
CALCIUM SERPL-MCNC: 9.6 MG/DL — SIGNIFICANT CHANGE UP (ref 8.5–10.1)
CHLORIDE SERPL-SCNC: 104 MMOL/L — SIGNIFICANT CHANGE UP (ref 96–108)
CO2 SERPL-SCNC: 30 MMOL/L — SIGNIFICANT CHANGE UP (ref 22–31)
CREAT SERPL-MCNC: 1.02 MG/DL — SIGNIFICANT CHANGE UP (ref 0.5–1.3)
GLUCOSE SERPL-MCNC: 228 MG/DL — HIGH (ref 70–99)
HCT VFR BLD CALC: 36.4 % — SIGNIFICANT CHANGE UP (ref 34.5–45)
HGB BLD-MCNC: 11.3 G/DL — LOW (ref 11.5–15.5)
MAGNESIUM SERPL-MCNC: 1.9 MG/DL — SIGNIFICANT CHANGE UP (ref 1.6–2.6)
MCHC RBC-ENTMCNC: 28.8 PG — SIGNIFICANT CHANGE UP (ref 27–34)
MCHC RBC-ENTMCNC: 31 GM/DL — LOW (ref 32–36)
MCV RBC AUTO: 92.9 FL — SIGNIFICANT CHANGE UP (ref 80–100)
NRBC # BLD: 0 /100 WBCS — SIGNIFICANT CHANGE UP (ref 0–0)
PLATELET # BLD AUTO: 284 K/UL — SIGNIFICANT CHANGE UP (ref 150–400)
POTASSIUM SERPL-MCNC: 4.3 MMOL/L — SIGNIFICANT CHANGE UP (ref 3.5–5.3)
POTASSIUM SERPL-SCNC: 4.3 MMOL/L — SIGNIFICANT CHANGE UP (ref 3.5–5.3)
RBC # BLD: 3.92 M/UL — SIGNIFICANT CHANGE UP (ref 3.8–5.2)
RBC # FLD: 13 % — SIGNIFICANT CHANGE UP (ref 10.3–14.5)
SODIUM SERPL-SCNC: 142 MMOL/L — SIGNIFICANT CHANGE UP (ref 135–145)
WBC # BLD: 14.57 K/UL — HIGH (ref 3.8–10.5)
WBC # FLD AUTO: 14.57 K/UL — HIGH (ref 3.8–10.5)

## 2019-11-17 PROCEDURE — 99233 SBSQ HOSP IP/OBS HIGH 50: CPT

## 2019-11-17 RX ORDER — IPRATROPIUM/ALBUTEROL SULFATE 18-103MCG
3 AEROSOL WITH ADAPTER (GRAM) INHALATION ONCE
Refills: 0 | Status: COMPLETED | OUTPATIENT
Start: 2019-11-17 | End: 2019-11-17

## 2019-11-17 RX ADMIN — ALBUTEROL 1 PUFF(S): 90 AEROSOL, METERED ORAL at 06:26

## 2019-11-17 RX ADMIN — Medication 3 MILLILITER(S): at 22:05

## 2019-11-17 RX ADMIN — ALBUTEROL 1 PUFF(S): 90 AEROSOL, METERED ORAL at 02:00

## 2019-11-17 RX ADMIN — Medication 3 MILLILITER(S): at 05:39

## 2019-11-17 RX ADMIN — Medication 40 MILLIGRAM(S): at 21:54

## 2019-11-17 RX ADMIN — ATENOLOL 25 MILLIGRAM(S): 25 TABLET ORAL at 06:27

## 2019-11-17 RX ADMIN — LETROZOLE 2.5 MILLIGRAM(S): 2.5 TABLET, FILM COATED ORAL at 14:12

## 2019-11-17 RX ADMIN — Medication 20 MILLIGRAM(S): at 19:00

## 2019-11-17 RX ADMIN — ALBUTEROL 1 PUFF(S): 90 AEROSOL, METERED ORAL at 18:40

## 2019-11-17 RX ADMIN — PANTOPRAZOLE SODIUM 40 MILLIGRAM(S): 20 TABLET, DELAYED RELEASE ORAL at 06:28

## 2019-11-17 RX ADMIN — Medication 0.5 MILLIGRAM(S): at 05:40

## 2019-11-17 RX ADMIN — ALBUTEROL 1 PUFF(S): 90 AEROSOL, METERED ORAL at 10:15

## 2019-11-17 RX ADMIN — Medication 1 TABLET(S): at 06:26

## 2019-11-17 RX ADMIN — INSULIN GLARGINE 10 UNIT(S): 100 INJECTION, SOLUTION SUBCUTANEOUS at 21:54

## 2019-11-17 RX ADMIN — Medication 6: at 13:00

## 2019-11-17 RX ADMIN — SIMETHICONE 80 MILLIGRAM(S): 80 TABLET, CHEWABLE ORAL at 06:28

## 2019-11-17 RX ADMIN — Medication 2: at 17:44

## 2019-11-17 RX ADMIN — LOSARTAN POTASSIUM 100 MILLIGRAM(S): 100 TABLET, FILM COATED ORAL at 06:27

## 2019-11-17 RX ADMIN — Medication 20 MILLIGRAM(S): at 06:27

## 2019-11-17 RX ADMIN — Medication 137 MICROGRAM(S): at 06:27

## 2019-11-17 RX ADMIN — Medication 3 MILLILITER(S): at 14:33

## 2019-11-17 RX ADMIN — Medication 4: at 08:51

## 2019-11-17 RX ADMIN — ALBUTEROL 1 PUFF(S): 90 AEROSOL, METERED ORAL at 14:09

## 2019-11-17 RX ADMIN — GABAPENTIN 300 MILLIGRAM(S): 400 CAPSULE ORAL at 06:27

## 2019-11-17 NOTE — PROGRESS NOTE ADULT - SUBJECTIVE AND OBJECTIVE BOX
INTERVAL HPI/OVERNIGHT EVENTS:  bipap in place for PRN usage    Vital Signs Last 24 Hrs  T(C): 37.1 (17 Nov 2019 18:15), Max: 37.1 (17 Nov 2019 18:15)  T(F): 98.7 (17 Nov 2019 18:15), Max: 98.7 (17 Nov 2019 18:15)  HR: 96 (17 Nov 2019 18:15) (68 - 113)  BP: 147/86 (17 Nov 2019 18:15) (105/53 - 148/75)  BP(mean): --  RR: 17 (17 Nov 2019 18:15) (16 - 18)  SpO2: 100% (17 Nov 2019 18:15) (94% - 100%)        PHYSICAL EXAM:  GEN:         Awake, responsive   HEENT:    Normal.    RESP:   bilat BS  CVS:             Regular rate and rhythm.   ABD:         Soft, non-tender, non-distended;   :             No costovertebral angle tenderness  EXTR:            No clubbing, cyanosis or edema  CNS:              Intact sensory and motor function.        MEDICATIONS  (STANDING):  ALBUTerol    90 MICROgram(s) HFA Inhaler 1 Puff(s) Inhalation every 4 hours  albuterol/ipratropium for Nebulization. 3 milliLiter(s) Nebulizer every 8 hours  amoxicillin  875 milliGRAM(s)/clavulanate 1 Tablet(s) Oral two times a day  ATENolol  Tablet 25 milliGRAM(s) Oral daily  buDESOnide    Inhalation Suspension 0.5 milliGRAM(s) Inhalation two times a day  dextrose 5%. 1000 milliLiter(s) (50 mL/Hr) IV Continuous <Continuous>  dextrose 50% Injectable 12.5 Gram(s) IV Push once  dextrose 50% Injectable 25 Gram(s) IV Push once  dextrose 50% Injectable 25 Gram(s) IV Push once  enoxaparin Injectable 40 milliGRAM(s) SubCutaneous daily  gabapentin 300 milliGRAM(s) Oral two times a day  insulin glargine Injectable (LANTUS) 10 Unit(s) SubCutaneous at bedtime  insulin lispro (HumaLOG) corrective regimen sliding scale   SubCutaneous three times a day before meals  insulin lispro (HumaLOG) corrective regimen sliding scale   SubCutaneous at bedtime  letrozole 2.5 milliGRAM(s) Oral daily  levothyroxine 137 MICROGram(s) Oral daily  losartan 100 milliGRAM(s) Oral daily  methylPREDNISolone sodium succinate Injectable 20 milliGRAM(s) IV Push two times a day  pantoprazole    Tablet 40 milliGRAM(s) Oral before breakfast  simethicone 80 milliGRAM(s) Chew two times a day  tiotropium 18 MICROgram(s) Capsule 1 Capsule(s) Inhalation daily    MEDICATIONS  (PRN):  ALPRAZolam 0.25 milliGRAM(s) Oral two times a day PRN anxiety  dextrose 40% Gel 15 Gram(s) Oral once PRN Blood Glucose LESS THAN 70 milliGRAM(s)/deciliter  glucagon  Injectable 1 milliGRAM(s) IntraMuscular once PRN Glucose LESS THAN 70 milligrams/deciliter        LABS:                        11.3   14.57 )-----------( 284      ( 17 Nov 2019 06:36 )             36.4     11-17    142  |  104  |  29<H>  ----------------------------<  228<H>  4.3   |  30  |  1.02    Ca    9.6      17 Nov 2019 06:36  Phos  3.0     11-16  Mg     1.9     11-17                RADIOLOGY & ADDITIONAL STUDIES:    ASSESSMENT: subcarinal mass with post obstructive process  PLAN: continue bipap as needed. possible transfer to [facility] [place].

## 2019-11-17 NOTE — PROGRESS NOTE ADULT - PROBLEM SELECTOR PLAN 1
levothyroxine further decreased to 137 mcg daily   keep TSH 0.3-0.6 , ( post cancer patient )  overdosing may cause  cardiac symptoms

## 2019-11-17 NOTE — RAPID RESPONSE TEAM SUMMARY - NSADDTLFINDINGSRRT_GEN_ALL_CORE
/116 HR 96 RR 24 O2 100% BSFS 167  CV: regular, tachy  lungs: +acc muscle use. diffuse wheezing with rhonchi, no rales. +bipap in place  abd: soft, ntnd, nabs  ext: no edema  neuro: aaox3, follows commands, answers questions,  EKG: NSR 95bpm, artifact, no ischemic changes

## 2019-11-17 NOTE — PROGRESS NOTE ADULT - ASSESSMENT
80 y/o female with history of HTN, HLD, DM2, likely CKD III, Hypothyroidism & Thyroid ca s/p surgeryx3 20+yrs ago & mets to R lung status post Radiation at [facility] [place] was there for f/u in July and found to have a possible new mass on R lung who p/w  post obstructive PNA and COPD exacerbation.     Acute respiratory failure with hypoxia  - c/w PRN BiPA   - due to PNA w/ underlying lung mets  - O2 supplemented, monitor pulse oxymetry   - c/w Duoneb, Spiriva and increase solumedrol    - Patient reports dyspnea happens after food - GI recomended simethicone  w/ low fiber, lactose free diet    Pneumonia of right middle lobe  - due to other gram-negative/gram-positive/atypical bacteria  - CT chest showed a large subcarinal mass with bihilar lymph nodes, obstructive pneumonitis vs pneumonia in the right middle and lower lobes   - ID switched Zosyn and Azithro to Augmentin  - NGTD on blood cultures  - pulmonary and ID following - will switch to PO regimen as per ID    Postoperative hypothyroidism  - TSH is <0.005, and free T4 is 2  - as per my conversation w/ Dr. Storey, pt's synthroid has been changed from 150mcg Mon-Fri w/ 137 saturation & Sunday to 137mcg daily  - as per endo, will need to repeat TFTs as outpt in 6wks    Malignant neoplasm of right lung  - CT chest showed a large subcarinal mass with bihilar lymph nodes & a left hepatic mass  - patient will follow up with oncologist at [place] upon discharge w/ plans for bronchoscopy   - c/w Femara     Uncontrolled type 2 diabetes mellitus with hyperglycemia  - c/w ISS  - c/w gabapentin     HTN  - c/w losartan  - resume home atenolol as patient is also intermittently in sinus tach    Prophylaxis:  DVT: Lovenox  GI:  Protonix  PT: home w/ home PT    Dispo: Pt had another episode of dyspnea requiring BiPAP today. I spoke w/ Dr. Rapp, who recommended the patient be transferred to [facility] [place] for bronch. I spoke w/ Dr. Yadira Ryan, a fellow at [place], who says she will arrange for transfer to their hospital.

## 2019-11-17 NOTE — PROGRESS NOTE ADULT - SUBJECTIVE AND OBJECTIVE BOX
80 y/o female with history of HTN, HLD, DM2, likely CKD III, Hypothyroidism & Thyroid ca s/p surgeryx3 20+yrs ago & mets to R lung status post [place] [facility] [place] was there for f/u in July and found to have a possible new mass on R  who p/w  post obstructive PNA. She  is lying in bed in NAD.     MEDICATIONS  (STANDING):  ALBUTerol    90 MICROgram(s) HFA Inhaler 1 Puff(s) Inhalation every 4 hours  albuterol/ipratropium for Nebulization. 3 milliLiter(s) Nebulizer every 8 hours  amoxicillin  875 milliGRAM(s)/clavulanate 1 Tablet(s) Oral two times a day  ATENolol  Tablet 25 milliGRAM(s) Oral daily  buDESOnide    Inhalation Suspension 0.5 milliGRAM(s) Inhalation two times a day  dextrose 5%. 1000 milliLiter(s) (50 mL/Hr) IV Continuous <Continuous>  dextrose 50% Injectable 12.5 Gram(s) IV Push once  dextrose 50% Injectable 25 Gram(s) IV Push once  dextrose 50% Injectable 25 Gram(s) IV Push once  enoxaparin Injectable 40 milliGRAM(s) SubCutaneous daily  gabapentin 300 milliGRAM(s) Oral two times a day  insulin glargine Injectable (LANTUS) 10 Unit(s) SubCutaneous at bedtime  insulin lispro (HumaLOG) corrective regimen sliding scale   SubCutaneous three times a day before meals  insulin lispro (HumaLOG) corrective regimen sliding scale   SubCutaneous at bedtime  letrozole 2.5 milliGRAM(s) Oral daily  levothyroxine 137 MICROGram(s) Oral daily  losartan 100 milliGRAM(s) Oral daily  methylPREDNISolone sodium succinate Injectable 20 milliGRAM(s) IV Push two times a day  pantoprazole    Tablet 40 milliGRAM(s) Oral before breakfast  simethicone 80 milliGRAM(s) Chew two times a day  tiotropium 18 MICROgram(s) Capsule 1 Capsule(s) Inhalation daily    MEDICATIONS  (PRN):  ALPRAZolam 0.25 milliGRAM(s) Oral two times a day PRN anxiety  dextrose 40% Gel 15 Gram(s) Oral once PRN Blood Glucose LESS THAN 70 milliGRAM(s)/deciliter  glucagon  Injectable 1 milliGRAM(s) IntraMuscular once PRN Glucose LESS THAN 70 milligrams/deciliter      Allergies    sulfa drugs (Unknown)    Intolerances        Vital Signs Last 24 Hrs  T(C): 37.1 (17 Nov 2019 18:15), Max: 37.1 (17 Nov 2019 18:15)  T(F): 98.7 (17 Nov 2019 18:15), Max: 98.7 (17 Nov 2019 18:15)  HR: 96 (17 Nov 2019 18:15) (68 - 113)  BP: 147/86 (17 Nov 2019 18:15) (105/53 - 148/75)  BP(mean): --  RR: 17 (17 Nov 2019 18:15) (16 - 18)  SpO2: 100% (17 Nov 2019 18:15) (94% - 100%)    PHYSICAL EXAM:  GENERAL: NAD, well-groomed, well-developed  HEAD:  Atraumatic, Normocephalic  EYES: EOMI, PERRLA   NECK: Supple   NERVOUS SYSTEM:  Alert    CHEST/LUNG: Clear to auscultation bilaterally; No rales, rhonchi, wheezing, or rubs  HEART: Regular rate and rhythm; No murmurs, rubs, or gallops  ABDOMEN: Soft, Nontender, Nondistended; Bowel sounds present  EXTREMITIES:  No clubbing, cyanosis, or edema      LABS:                        11.3   14.57 )-----------( 284      ( 17 Nov 2019 06:36 )             36.4     11-17    142  |  104  |  29<H>  ----------------------------<  228<H>  4.3   |  30  |  1.02    Ca    9.6      17 Nov 2019 06:36  Phos  3.0     11-16  Mg     1.9     11-17          CAPILLARY BLOOD GLUCOSE      POCT Blood Glucose.: 184 mg/dL (17 Nov 2019 17:40)  POCT Blood Glucose.: 292 mg/dL (17 Nov 2019 12:40)  POCT Blood Glucose.: 237 mg/dL (17 Nov 2019 08:33)  POCT Blood Glucose.: 123 mg/dL (16 Nov 2019 23:21)      RADIOLOGY & ADDITIONAL TESTS:    11-16-19 @ 07:01  -  11-17-19 @ 07:00  --------------------------------------------------------  IN:    Oral Fluid: 120 mL  Total IN: 120 mL    OUT:    Voided: 350 mL  Total OUT: 350 mL    Total NET: -230 mL      11-17-19 @ 07:01  -  11-17-19 @ 21:25  --------------------------------------------------------  IN:    Oral Fluid: 300 mL  Total IN: 300 mL    OUT:  Total OUT: 0 mL    Total NET: 300 mL

## 2019-11-17 NOTE — PROGRESS NOTE ADULT - SUBJECTIVE AND OBJECTIVE BOX
Patient is a 79y old  Female who presents with a chief complaint of sob (17 Nov 2019 19:58)      Interval History:    MEDICATIONS  (STANDING):  ALBUTerol    90 MICROgram(s) HFA Inhaler 1 Puff(s) Inhalation every 4 hours  albuterol/ipratropium for Nebulization. 3 milliLiter(s) Nebulizer every 8 hours  amoxicillin  875 milliGRAM(s)/clavulanate 1 Tablet(s) Oral two times a day  ATENolol  Tablet 25 milliGRAM(s) Oral daily  buDESOnide    Inhalation Suspension 0.5 milliGRAM(s) Inhalation two times a day  dextrose 5%. 1000 milliLiter(s) (50 mL/Hr) IV Continuous <Continuous>  dextrose 50% Injectable 12.5 Gram(s) IV Push once  dextrose 50% Injectable 25 Gram(s) IV Push once  dextrose 50% Injectable 25 Gram(s) IV Push once  enoxaparin Injectable 40 milliGRAM(s) SubCutaneous daily  gabapentin 300 milliGRAM(s) Oral two times a day  insulin glargine Injectable (LANTUS) 10 Unit(s) SubCutaneous at bedtime  insulin lispro (HumaLOG) corrective regimen sliding scale   SubCutaneous three times a day before meals  insulin lispro (HumaLOG) corrective regimen sliding scale   SubCutaneous at bedtime  letrozole 2.5 milliGRAM(s) Oral daily  levothyroxine 137 MICROGram(s) Oral daily  losartan 100 milliGRAM(s) Oral daily  methylPREDNISolone sodium succinate Injectable 20 milliGRAM(s) IV Push two times a day  pantoprazole    Tablet 40 milliGRAM(s) Oral before breakfast  simethicone 80 milliGRAM(s) Chew two times a day  tiotropium 18 MICROgram(s) Capsule 1 Capsule(s) Inhalation daily    MEDICATIONS  (PRN):  ALPRAZolam 0.25 milliGRAM(s) Oral two times a day PRN anxiety  dextrose 40% Gel 15 Gram(s) Oral once PRN Blood Glucose LESS THAN 70 milliGRAM(s)/deciliter  glucagon  Injectable 1 milliGRAM(s) IntraMuscular once PRN Glucose LESS THAN 70 milligrams/deciliter      Allergies    sulfa drugs (Unknown)    Intolerances        REVIEW OF SYSTEMS:  CONSTITUTIONAL: no changes  EYES: No eye pain, visual disturbances, or discharge  ENMT:  No difficulty hearing, No sinus or throat pain  NECK: No pain or stiffness  RESPIRATORY: No cough, wheezing, chills or hemoptysis; No shortness of breath  CARDIOVASCULAR: No chest pain, palpitations or leg swelling  GASTROINTESTINAL: No abdominal or epigastric pain. No nausea, vomiting, or hematemesis; No diarrhea or constipation. No melena or hematochezia.  GENITOURINARY: No dysuria, frequency, hematuria, or incontinence  NEUROLOGICAL: No headaches, memory loss, loss of strength, numbness, or tremors  SKIN: No itching, burning, rashes, or lesions   ENDOCRINE: No heat or cold intolerance; No hair loss  MUSCULOSKELETAL: No joint pain or swelling; No muscle, back, or extremity pain  PSYCHIATRIC: No depression, anxiety, mood swings, or difficulty sleeping  HEME/LYMPH: No easy bruising, or bleeding gums  ALLERY AND IMMUNOLOGIC: No hives or eczema    Vital Signs Last 24 Hrs  T(C): 37.1 (17 Nov 2019 18:15), Max: 37.1 (17 Nov 2019 18:15)  T(F): 98.7 (17 Nov 2019 18:15), Max: 98.7 (17 Nov 2019 18:15)  HR: 96 (17 Nov 2019 18:15) (68 - 113)  BP: 147/86 (17 Nov 2019 18:15) (105/53 - 148/75)  BP(mean): --  RR: 17 (17 Nov 2019 18:15) (16 - 18)  SpO2: 100% (17 Nov 2019 18:15) (94% - 100%)    PHYSICAL EXAM:  GENERAL:   HEAD: Atraumatic, Normocephalic  EYES: PERRLA, conjunctiva and sclera clear  ENMT: No tonsillar erythema, exudates, or enlargement; Moist mucous membranes, Good dentition, No lesions  NECK: Supple, No JVD, Normal thyroid  NERVOUS SYSTEM:  Alert & Oriented X3, Good concentration; Motor Strength 5/5 B/L upper and lower extremities  CHEST/LUNG: Clear to auscultaion bilaterally; No rales, rhonchi, wheezing, or rubs  HEART: Regular rate and rhythm; No murmurs, rubs, or gallops  ABDOMEN: Soft, Nontender, Nondistended; Bowel sounds present  EXTREMITIES:  2+ Peripheral Pulses, no edema  SKIN: No rashes or lesions    LABS:        CAPILLARY BLOOD GLUCOSE      POCT Blood Glucose.: 184 mg/dL (17 Nov 2019 17:40)  POCT Blood Glucose.: 292 mg/dL (17 Nov 2019 12:40)  POCT Blood Glucose.: 237 mg/dL (17 Nov 2019 08:33)  POCT Blood Glucose.: 123 mg/dL (16 Nov 2019 23:21)    Lipid panel:           Thyroid:  Diabetes Tests:  Parathyroid Panel:  Adrenals:  RADIOLOGY & ADDITIONAL TESTS:    Imaging Personally Reviewed:  [ ] YES  [ ] NO    Consultant(s) Notes Reviewed:  [ ] YES  [ ] NO    Care Discussed with Consultants/Other Providers [ ] YES  [ ] NO

## 2019-11-17 NOTE — RAPID RESPONSE TEAM SUMMARY - NSSITUATIONBACKGROUNDRRT_GEN_ALL_CORE
78 y/o female w/pmhx of HTN, HLD, DM2, Thyroid ca s/p surgeryx3  20+yrs ago, R lung ca s/p resection at [place], admitted now with new carinal mass with post obstructive PNA, started on abx and solumedrol.  RRT called by RN after pt took drink of soda and started coughing followed by increased work of breathing. PRn bipap at bedside placed by RT.

## 2019-11-18 VITALS
HEART RATE: 114 BPM | OXYGEN SATURATION: 98 % | RESPIRATION RATE: 18 BRPM | DIASTOLIC BLOOD PRESSURE: 89 MMHG | SYSTOLIC BLOOD PRESSURE: 167 MMHG

## 2019-11-18 DIAGNOSIS — E03.9 HYPOTHYROIDISM, UNSPECIFIED: ICD-10-CM

## 2019-11-18 DIAGNOSIS — E09.9 DRUG OR CHEMICAL INDUCED DIABETES MELLITUS WITHOUT COMPLICATIONS: ICD-10-CM

## 2019-11-18 LAB
HCT VFR BLD CALC: 36.6 % — SIGNIFICANT CHANGE UP (ref 34.5–45)
HGB BLD-MCNC: 11.5 G/DL — SIGNIFICANT CHANGE UP (ref 11.5–15.5)
MCHC RBC-ENTMCNC: 29.5 PG — SIGNIFICANT CHANGE UP (ref 27–34)
MCHC RBC-ENTMCNC: 31.4 GM/DL — LOW (ref 32–36)
MCV RBC AUTO: 93.8 FL — SIGNIFICANT CHANGE UP (ref 80–100)
NRBC # BLD: 0 /100 WBCS — SIGNIFICANT CHANGE UP (ref 0–0)
PLATELET # BLD AUTO: 286 K/UL — SIGNIFICANT CHANGE UP (ref 150–400)
RBC # BLD: 3.9 M/UL — SIGNIFICANT CHANGE UP (ref 3.8–5.2)
RBC # FLD: 13.1 % — SIGNIFICANT CHANGE UP (ref 10.3–14.5)
WBC # BLD: 14.89 K/UL — HIGH (ref 3.8–10.5)
WBC # FLD AUTO: 14.89 K/UL — HIGH (ref 3.8–10.5)

## 2019-11-18 PROCEDURE — 99239 HOSP IP/OBS DSCHRG MGMT >30: CPT

## 2019-11-18 RX ORDER — GABAPENTIN 400 MG/1
1 CAPSULE ORAL
Qty: 0 | Refills: 0 | DISCHARGE
Start: 2019-11-18

## 2019-11-18 RX ORDER — PIPERACILLIN AND TAZOBACTAM 4; .5 G/20ML; G/20ML
3.38 INJECTION, POWDER, LYOPHILIZED, FOR SOLUTION INTRAVENOUS ONCE
Refills: 0 | Status: COMPLETED | OUTPATIENT
Start: 2019-11-18 | End: 2019-11-18

## 2019-11-18 RX ORDER — INSULIN LISPRO 100/ML
5 VIAL (ML) SUBCUTANEOUS
Refills: 0 | Status: DISCONTINUED | OUTPATIENT
Start: 2019-11-18 | End: 2019-11-18

## 2019-11-18 RX ORDER — LEVOTHYROXINE SODIUM 125 MCG
1 TABLET ORAL
Qty: 0 | Refills: 0 | DISCHARGE

## 2019-11-18 RX ORDER — TIOTROPIUM BROMIDE 18 UG/1
1 CAPSULE ORAL; RESPIRATORY (INHALATION)
Qty: 0 | Refills: 0 | DISCHARGE
Start: 2019-11-18

## 2019-11-18 RX ORDER — LEVOTHYROXINE SODIUM 125 MCG
1 TABLET ORAL
Qty: 0 | Refills: 0 | DISCHARGE
Start: 2019-11-18

## 2019-11-18 RX ORDER — SIMETHICONE 80 MG/1
1 TABLET, CHEWABLE ORAL
Qty: 0 | Refills: 0 | DISCHARGE
Start: 2019-11-18

## 2019-11-18 RX ORDER — INSULIN GLARGINE 100 [IU]/ML
15 INJECTION, SOLUTION SUBCUTANEOUS AT BEDTIME
Refills: 0 | Status: DISCONTINUED | OUTPATIENT
Start: 2019-11-18 | End: 2019-11-18

## 2019-11-18 RX ORDER — SODIUM CHLORIDE 9 MG/ML
1000 INJECTION, SOLUTION INTRAVENOUS
Refills: 0 | Status: DISCONTINUED | OUTPATIENT
Start: 2019-11-18 | End: 2019-11-18

## 2019-11-18 RX ORDER — LOSARTAN POTASSIUM 100 MG/1
0 TABLET, FILM COATED ORAL
Qty: 90 | Refills: 0 | DISCHARGE

## 2019-11-18 RX ORDER — BUDESONIDE AND FORMOTEROL FUMARATE DIHYDRATE 160; 4.5 UG/1; UG/1
2 AEROSOL RESPIRATORY (INHALATION)
Qty: 0 | Refills: 0 | DISCHARGE

## 2019-11-18 RX ORDER — LETROZOLE 2.5 MG/1
1 TABLET, FILM COATED ORAL
Qty: 0 | Refills: 0 | DISCHARGE
Start: 2019-11-18

## 2019-11-18 RX ORDER — IPRATROPIUM BROMIDE 0.2 MG/ML
500 SOLUTION, NON-ORAL INHALATION ONCE
Refills: 0 | Status: COMPLETED | OUTPATIENT
Start: 2019-11-18 | End: 2019-11-18

## 2019-11-18 RX ORDER — ACETAMINOPHEN 500 MG
650 TABLET ORAL EVERY 6 HOURS
Refills: 0 | Status: DISCONTINUED | OUTPATIENT
Start: 2019-11-18 | End: 2019-11-18

## 2019-11-18 RX ORDER — LOSARTAN POTASSIUM 100 MG/1
1 TABLET, FILM COATED ORAL
Qty: 0 | Refills: 0 | DISCHARGE
Start: 2019-11-18

## 2019-11-18 RX ORDER — LETROZOLE 2.5 MG/1
0 TABLET, FILM COATED ORAL
Qty: 90 | Refills: 0 | DISCHARGE

## 2019-11-18 RX ORDER — GABAPENTIN 400 MG/1
0 CAPSULE ORAL
Qty: 270 | Refills: 0 | DISCHARGE

## 2019-11-18 RX ORDER — IPRATROPIUM/ALBUTEROL SULFATE 18-103MCG
3 AEROSOL WITH ADAPTER (GRAM) INHALATION
Qty: 0 | Refills: 0 | DISCHARGE
Start: 2019-11-18

## 2019-11-18 RX ADMIN — SIMETHICONE 80 MILLIGRAM(S): 80 TABLET, CHEWABLE ORAL at 17:22

## 2019-11-18 RX ADMIN — SODIUM CHLORIDE 60 MILLILITER(S): 9 INJECTION, SOLUTION INTRAVENOUS at 15:50

## 2019-11-18 RX ADMIN — PIPERACILLIN AND TAZOBACTAM 200 GRAM(S): 4; .5 INJECTION, POWDER, LYOPHILIZED, FOR SOLUTION INTRAVENOUS at 19:01

## 2019-11-18 RX ADMIN — Medication 650 MILLIGRAM(S): at 06:55

## 2019-11-18 RX ADMIN — ALBUTEROL 1 PUFF(S): 90 AEROSOL, METERED ORAL at 10:25

## 2019-11-18 RX ADMIN — Medication 3 MILLILITER(S): at 05:57

## 2019-11-18 RX ADMIN — Medication 20 MILLIGRAM(S): at 07:26

## 2019-11-18 RX ADMIN — Medication 500 MICROGRAM(S): at 09:45

## 2019-11-18 RX ADMIN — Medication 4: at 11:38

## 2019-11-18 RX ADMIN — Medication 0.5 MILLIGRAM(S): at 09:45

## 2019-11-18 RX ADMIN — Medication 4: at 17:21

## 2019-11-18 RX ADMIN — Medication 6: at 07:57

## 2019-11-18 RX ADMIN — ENOXAPARIN SODIUM 40 MILLIGRAM(S): 100 INJECTION SUBCUTANEOUS at 17:25

## 2019-11-18 RX ADMIN — Medication 20 MILLIGRAM(S): at 17:22

## 2019-11-18 RX ADMIN — Medication 0.5 MILLIGRAM(S): at 05:59

## 2019-11-18 RX ADMIN — Medication 650 MILLIGRAM(S): at 05:57

## 2019-11-18 RX ADMIN — ALBUTEROL 1 PUFF(S): 90 AEROSOL, METERED ORAL at 14:33

## 2019-11-18 RX ADMIN — ALBUTEROL 1 PUFF(S): 90 AEROSOL, METERED ORAL at 17:22

## 2019-11-18 RX ADMIN — Medication 3 MILLILITER(S): at 14:24

## 2019-11-18 RX ADMIN — Medication 0.5 MILLIGRAM(S): at 17:37

## 2019-11-18 NOTE — PROGRESS NOTE ADULT - PROBLEM SELECTOR PLAN 1
once steroids are decreased expect better finger sticks   increase Lantus to 15 units and prandial lispro to 5 units   when steroids are decreased , decrease the dose of insulin accordingly   while inpatient Finger Sticks should be in 140-180 range, preferably <160 which is difficult to achieve as the patient has increased insulin resistance and is on steroids

## 2019-11-18 NOTE — PROGRESS NOTE ADULT - PROBLEM SELECTOR PROBLEM 5
Hypothyroidism
Malignant neoplasm of right lung, unspecified part of lung
Uncontrolled type 2 diabetes mellitus with hyperglycemia

## 2019-11-18 NOTE — DISCHARGE NOTE PROVIDER - NSDCCPCAREPLAN_GEN_ALL_CORE_FT
PRINCIPAL DISCHARGE DIAGNOSIS  Diagnosis: Pneumonia  Assessment and Plan of Treatment:       SECONDARY DISCHARGE DIAGNOSES  Diagnosis: SOB (shortness of breath)  Assessment and Plan of Treatment:

## 2019-11-18 NOTE — PROGRESS NOTE ADULT - SUBJECTIVE AND OBJECTIVE BOX
Patient is a 79y old  Female who presents with a chief complaint of sob (17 Nov 2019 21:25)      Interval History: finger sticks are increased and only on Lantus 10 units and Lispro sliding scale coverage with meals   on levothyroxine 137 mcg and also IV Solumedrol   steroid induced hyperglycemia     MEDICATIONS  (STANDING):  ALBUTerol    90 MICROgram(s) HFA Inhaler 1 Puff(s) Inhalation every 4 hours  albuterol/ipratropium for Nebulization. 3 milliLiter(s) Nebulizer every 8 hours  amoxicillin  875 milliGRAM(s)/clavulanate 1 Tablet(s) Oral two times a day  ATENolol  Tablet 25 milliGRAM(s) Oral daily  buDESOnide    Inhalation Suspension 0.5 milliGRAM(s) Inhalation two times a day  dextrose 5%. 1000 milliLiter(s) (50 mL/Hr) IV Continuous <Continuous>  dextrose 50% Injectable 12.5 Gram(s) IV Push once  dextrose 50% Injectable 25 Gram(s) IV Push once  dextrose 50% Injectable 25 Gram(s) IV Push once  enoxaparin Injectable 40 milliGRAM(s) SubCutaneous daily  gabapentin 300 milliGRAM(s) Oral two times a day  insulin glargine Injectable (LANTUS) 15 Unit(s) SubCutaneous at bedtime  insulin lispro (HumaLOG) corrective regimen sliding scale   SubCutaneous three times a day before meals  insulin lispro (HumaLOG) corrective regimen sliding scale   SubCutaneous at bedtime  insulin lispro Injectable (HumaLOG) 5 Unit(s) SubCutaneous before breakfast  insulin lispro Injectable (HumaLOG) 5 Unit(s) SubCutaneous before lunch  insulin lispro Injectable (HumaLOG) 5 Unit(s) SubCutaneous before dinner  letrozole 2.5 milliGRAM(s) Oral daily  levothyroxine 137 MICROGram(s) Oral daily  losartan 100 milliGRAM(s) Oral daily  methylPREDNISolone sodium succinate Injectable 20 milliGRAM(s) IV Push two times a day  pantoprazole    Tablet 40 milliGRAM(s) Oral before breakfast  simethicone 80 milliGRAM(s) Chew two times a day  tiotropium 18 MICROgram(s) Capsule 1 Capsule(s) Inhalation daily    MEDICATIONS  (PRN):  acetaminophen    Suspension .. 650 milliGRAM(s) Oral every 6 hours PRN Mild Pain (1 - 3)  ALPRAZolam 0.25 milliGRAM(s) Oral two times a day PRN anxiety  dextrose 40% Gel 15 Gram(s) Oral once PRN Blood Glucose LESS THAN 70 milliGRAM(s)/deciliter  glucagon  Injectable 1 milliGRAM(s) IntraMuscular once PRN Glucose LESS THAN 70 milligrams/deciliter      Allergies    sulfa drugs (Unknown)    Intolerances        REVIEW OF SYSTEMS:  CONSTITUTIONAL: no changes  EYES: No eye pain, visual disturbances, or discharge  ENMT:  No difficulty hearing, No sinus or throat pain  NECK: No pain or stiffness  RESPIRATORY: No cough, wheezing, chills or hemoptysis; No shortness of breath  CARDIOVASCULAR: No chest pain, palpitations or leg swelling  GASTROINTESTINAL: No abdominal or epigastric pain. No nausea, vomiting, or hematemesis; No diarrhea or constipation. No melena or hematochezia.  GENITOURINARY: No dysuria, frequency, hematuria, or incontinence  NEUROLOGICAL: No headaches, memory loss, loss of strength, numbness, or tremors  SKIN: No itching, burning, rashes, or lesions   ENDOCRINE: No heat or cold intolerance; No hair loss  MUSCULOSKELETAL: No joint pain or swelling; No muscle, back, or extremity pain  PSYCHIATRIC: No depression, anxiety, mood swings, or difficulty sleeping  HEME/LYMPH: No easy bruising, or bleeding gums  ALLERY AND IMMUNOLOGIC: No hives or eczema    Vital Signs Last 24 Hrs  T(C): 37.1 (18 Nov 2019 12:54), Max: 37.1 (17 Nov 2019 18:15)  T(F): 98.7 (18 Nov 2019 12:54), Max: 98.7 (17 Nov 2019 18:15)  HR: 104 (18 Nov 2019 12:54) (58 - 129)  BP: 128/72 (18 Nov 2019 12:54) (128/72 - 202/118)  BP(mean): --  RR: 19 (18 Nov 2019 12:54) (17 - 34)  SpO2: 98% (18 Nov 2019 12:54) (95% - 100%)    PHYSICAL EXAM:  GENERAL:   HEAD: Atraumatic, Normocephalic  EYES: PERRLA, conjunctiva and sclera clear  ENMT: No tonsillar erythema, exudates, or enlargement; Moist mucous membranes, Good dentition, No lesions  NECK: Supple, No JVD, Normal thyroid  NERVOUS SYSTEM:  Alert & Oriented X3, Good concentration; Motor Strength 5/5 B/L upper and lower extremities  CHEST/LUNG: Clear to auscultaion bilaterally; No rales, rhonchi, wheezing, or rubs  HEART: Regular rate and rhythm; No murmurs, rubs, or gallops  ABDOMEN: Soft, Nontender, Nondistended; Bowel sounds present  EXTREMITIES:  2+ Peripheral Pulses, no edema  SKIN: No rashes or lesions    LABS:        CAPILLARY BLOOD GLUCOSE      POCT Blood Glucose.: 248 mg/dL (18 Nov 2019 10:58)  POCT Blood Glucose.: 249 mg/dL (18 Nov 2019 09:21)  POCT Blood Glucose.: 274 mg/dL (18 Nov 2019 07:17)  POCT Blood Glucose.: 167 mg/dL (17 Nov 2019 21:26)  POCT Blood Glucose.: 184 mg/dL (17 Nov 2019 17:40)    Lipid panel:           Thyroid:  Diabetes Tests:  Parathyroid Panel:  Adrenals:  RADIOLOGY & ADDITIONAL TESTS:    Imaging Personally Reviewed:  [ ] YES  [ ] NO    Consultant(s) Notes Reviewed:  [ ] YES  [ ] NO    Care Discussed with Consultants/Other Providers [ ] YES  [ ] NO

## 2019-11-18 NOTE — DISCHARGE NOTE PROVIDER - PROVIDER TOKENS
FREE:[LAST:[your PMD],PHONE:[(   )    -],FAX:[(   )    -]],FREE:[LAST:[Your endocrinologist],PHONE:[(   )    -],FAX:[(   )    -]]

## 2019-11-18 NOTE — PROGRESS NOTE ADULT - PROBLEM SELECTOR PROBLEM 2
Bronchitis
Hypothyroidism, unspecified type
Pneumonia of right middle lobe due to other aerobic gram-negative bacteria
Uncontrolled type 2 diabetes mellitus with hyperglycemia
Uncontrolled type 2 diabetes mellitus with hyperglycemia

## 2019-11-18 NOTE — PROGRESS NOTE ADULT - PROBLEM SELECTOR PROBLEM 6
Malignant neoplasm of right lung, unspecified part of lung
Uncontrolled type 2 diabetes mellitus with hyperglycemia

## 2019-11-18 NOTE — DISCHARGE NOTE PROVIDER - HOSPITAL COURSE
80 yo female with history of HTN, HLD, DM2, likely CKD III, Hypothyroidism & Thyroid ca s/p surgeryx3 20+yrs ago & mets to R lung status post Radiation at [facility] was there for f/u in July and found to have a possible new mass on R lung who p/w Acute respiratory failure with hypoxia due to post obstructive PNA and COPD exacerbation. She was put on O2 supplemented, Duoneb, Spiriva and solumedrol. CT chest showed a large subcarinal mass with bihilar lymph nodes, obstructive pneumonitis vs pneumonia in the right middle and lower lobes. ID started the patient on Zosyn and Azithro and then switched her to Augmentin. Of note, the pt's TSH was <0.005 and free T4 was 2. Dr. Storey, switched the pt's synthroid from 150mcg Mon-Fri w/ 137 saturation & Sunday to 137mcg daily and patient was informed that she'll need to repeat TFTs as outpt in 6wks. Her CT chest showed a large subcarinal mass with bihilar lymph nodes & a left hepatic mass. The patient follows up with her oncologist, Dr. Freeman, at [facility] and was scheduled for a bronchoscopy before her admission. She had multiple episodes of dyspnea requiring BiPAP. These episodes occurred usually when she was eating (though today it occurred ofter she was made NPO. They all fully resolved within 30-45 minutes after the patient reported "burping." GI was consulted and recommended simethicone  w/ low fiber, lactose free diet. Pulm recommended the patient be transferred to [facility] for bronch and the patient was transferred to Dr. Freeman's service.         Discharge time: 43 minutes

## 2019-11-18 NOTE — DISCHARGE NOTE PROVIDER - NSDCMRMEDTOKEN_GEN_ALL_CORE_FT
amoxicillin-clavulanate 875 mg-125 mg oral tablet: 1 tab(s) orally 2 times a day  atenolol 25 mg oral tablet: 1 tab(s) orally once a day  budesonide-formoterol 160 mcg-4.5 mcg/inh inhalation aerosol: 2 puff(s) inhaled 2 times a day  gabapentin 300 mg oral capsule: 1 cap(s) orally 2 times a day  ipratropium-albuterol 0.5 mg-2.5 mg/3 mLinhalation solution: 3 milliliter(s) inhaled every 8 hours, As Needed for shortness of breath  letrozole 2.5 mg oral tablet: 1 tab(s) orally once a day  levothyroxine 137 mcg (0.137 mg) oral tablet: 1 tab(s) orally once a day  losartan 100 mg oral tablet: 1 tab(s) orally once a day  methylPREDNISolone: 20 milligram(s) intravenous 2 times a day  OMEPRAZOLE   CAP 20MG:   simethicone 80 mg oral tablet, chewable: 1 tab(s) orally 2 times a day  tiotropium 18 mcg inhalation capsule: 1 cap(s) inhaled once a day

## 2019-11-18 NOTE — PROGRESS NOTE ADULT - ASSESSMENT
80 y/o female w/pmhx of HTN, HLD, DM2, Thyroid ca s/p surgeryx3  20+yrs ago, R lung ca s/p resection at [place], was there for f/u in July and found to have a possible new mass on R and is scheduled for Bronch on coming tuesday.   has , likely obstructive pneumonia  discussed with PMD today   high wbc ? steroids  as per nursing just got comfortable   was in a chair earlier and aleja a RRT   discharge plan held today as tachycardic and high wbc  just saw dr robertson intervention   discussed with nurse by bedside   will follow; procal is relatively low 78 y/o female w/pmhx of HTN, HLD, DM2, Thyroid ca s/p surgeryx3  20+yrs ago, R lung ca s/p resection at [place], was there for f/u in July and found to have a possible new mass on R and is scheduled for Bronch on coming tuesday.   has , likely obstructive pneumonia  was in a chair earlier and aleja a RRT   discharge plan today to [facility] noted   discussed with nurse and daughter  by bedside    procal is relatively low

## 2019-11-18 NOTE — PROGRESS NOTE ADULT - SUBJECTIVE AND OBJECTIVE BOX
HPI:  Pt is a 80 y/o female w/pmhx of HTN, HLD, DM2, Thyroid ca s/p surgeryx3  20+yrs ago, R lung ca s/p resection at [place], was there for f/u in July and found to have a possible new mass on R and is scheduled for Bronch on coming tuesday.  Pt reports over the last 2-3 days she has been noticing wheezing and gets a bout of cough and along w/that sob.  she is not bringing anything up with cough. States today she had a bout of coughing and couldnt catch her breat after and son called ems.  she had denied any fevers at home, but febrile in ed.  pt denies any sick contacts or travels, no cp, palpitations, n/v/d/c.  she had seen pmd few days ago for this and was started on inhalers and states felt a bit better but when gets cough gets the sob. (09 Nov 2019 23:56)      Allergies    sulfa drugs (Unknown)    Intolerances        MEDICATIONS  (STANDING):  ALBUTerol    90 MICROgram(s) HFA Inhaler 1 Puff(s) Inhalation every 4 hours  albuterol/ipratropium for Nebulization. 3 milliLiter(s) Nebulizer every 8 hours  amoxicillin  875 milliGRAM(s)/clavulanate 1 Tablet(s) Oral two times a day  ATENolol  Tablet 25 milliGRAM(s) Oral daily  buDESOnide    Inhalation Suspension 0.5 milliGRAM(s) Inhalation two times a day  dextrose 5% + sodium chloride 0.45%. 1000 milliLiter(s) (60 mL/Hr) IV Continuous <Continuous>  dextrose 5%. 1000 milliLiter(s) (50 mL/Hr) IV Continuous <Continuous>  dextrose 50% Injectable 12.5 Gram(s) IV Push once  dextrose 50% Injectable 25 Gram(s) IV Push once  dextrose 50% Injectable 25 Gram(s) IV Push once  enoxaparin Injectable 40 milliGRAM(s) SubCutaneous daily  gabapentin 300 milliGRAM(s) Oral two times a day  insulin glargine Injectable (LANTUS) 15 Unit(s) SubCutaneous at bedtime  insulin lispro (HumaLOG) corrective regimen sliding scale   SubCutaneous three times a day before meals  insulin lispro (HumaLOG) corrective regimen sliding scale   SubCutaneous at bedtime  insulin lispro Injectable (HumaLOG) 5 Unit(s) SubCutaneous before breakfast  insulin lispro Injectable (HumaLOG) 5 Unit(s) SubCutaneous before lunch  insulin lispro Injectable (HumaLOG) 5 Unit(s) SubCutaneous before dinner  letrozole 2.5 milliGRAM(s) Oral daily  levothyroxine 137 MICROGram(s) Oral daily  losartan 100 milliGRAM(s) Oral daily  methylPREDNISolone sodium succinate Injectable 20 milliGRAM(s) IV Push two times a day  pantoprazole    Tablet 40 milliGRAM(s) Oral before breakfast  simethicone 80 milliGRAM(s) Chew two times a day  tiotropium 18 MICROgram(s) Capsule 1 Capsule(s) Inhalation daily    MEDICATIONS  (PRN):  acetaminophen    Suspension .. 650 milliGRAM(s) Oral every 6 hours PRN Mild Pain (1 - 3)  ALPRAZolam 0.25 milliGRAM(s) Oral two times a day PRN anxiety  dextrose 40% Gel 15 Gram(s) Oral once PRN Blood Glucose LESS THAN 70 milliGRAM(s)/deciliter  glucagon  Injectable 1 milliGRAM(s) IntraMuscular once PRN Glucose LESS THAN 70 milligrams/deciliter      REVIEW OF SYSTEMS:    CONSTITUTIONAL: No fever, chills, weight loss, or fatigue  HEENT: No sore throat, runny nose, ear ache  RESPIRATORY: No cough, wheezing, No shortness of breath  CARDIOVASCULAR: No chest pain, palpitations, dizziness  GASTROINTESTINAL: No abdominal pain. No nausea, vomiting, diarrhea  GENITOURINARY: No dysuria, increase frequency, hematuria, or incontinence  NEUROLOGICAL: No headaches, memory loss, loss of strength, numbness, or tremors, no weakness  EXTREMITY: No pedal edema BLE  SKIN: No itching, burning, rashes, or lesions     VITAL SIGNS:  T(C): 36.7 (11-18-19 @ 17:51), Max: 37.1 (11-17-19 @ 18:15)  T(F): 98 (11-18-19 @ 17:51), Max: 98.7 (11-17-19 @ 18:15)  HR: 102 (11-18-19 @ 17:51) (58 - 129)  BP: 157/79 (11-18-19 @ 17:51) (119/68 - 202/118)  RR: 18 (11-18-19 @ 17:51) (17 - 34)  SpO2: 100% (11-18-19 @ 17:51) (94% - 100%)  Wt(kg): --    PHYSICAL EXAM:    GENERAL: not in any distress  HEENT: Neck is supple, normocephalic, atraumatic   CHEST/LUNG: Clear to auscultation bilaterally; No rales, rhonchi, wheezing  HEART: Regular rate and rhythm; No murmurs, rubs, or gallops  ABDOMEN: Soft, Nontender, Nondistended; Bowel sounds present, no rebound   EXTREMITIES:  2+ Peripheral Pulses, No clubbing, cyanosis, or edema  GENITOURINARY:   SKIN: No rashes or lesions  BACK: no pressor sore   NERVOUS SYSTEM:  Alert & Oriented X3, Good concentration  PSYCH: normal affect     LABS:                         11.5   14.89 )-----------( 286      ( 18 Nov 2019 06:33 )             36.6     11-17    142  |  104  |  29<H>  ----------------------------<  228<H>  4.3   |  30  |  1.02    Ca    9.6      17 Nov 2019 06:36  Mg     1.9     11-17                  Thyroid Stimulating Hormone, Serum: <0.005 uIU/mL (11-14 @ 08:19)                              Radiology: HPI:  Pt is a 78 y/o female w/pmhx of HTN, HLD, DM2, Thyroid ca s/p surgeryx3  20+yrs ago, R lung ca s/p resection at [place], was there for f/u in July and found to have a possible new mass on R and is scheduled for Bronch on coming tuesday.  Pt reports over the last 2-3 days she has been noticing wheezing and gets a bout of cough and along w/that sob.  she is not bringing anything up with cough. States today she had a bout of coughing and couldnt catch her breat after and son called ems.  she had denied any fevers at home, but febrile in ed.  pt denies any sick contacts or travels, no cp, palpitations, n/v/d/c.  she had seen pmd few days ago for this and was started on inhalers and states felt a bit better but when gets cough gets the sob. (09 Nov 2019 23:56)  has had off and periods of desaturation   today again on bipap with breathing issues     Allergies    sulfa drugs (Unknown)    Intolerances        MEDICATIONS  (STANDING):  ALBUTerol    90 MICROgram(s) HFA Inhaler 1 Puff(s) Inhalation every 4 hours  albuterol/ipratropium for Nebulization. 3 milliLiter(s) Nebulizer every 8 hours  amoxicillin  875 milliGRAM(s)/clavulanate 1 Tablet(s) Oral two times a day  ATENolol  Tablet 25 milliGRAM(s) Oral daily  buDESOnide    Inhalation Suspension 0.5 milliGRAM(s) Inhalation two times a day  dextrose 5% + sodium chloride 0.45%. 1000 milliLiter(s) (60 mL/Hr) IV Continuous <Continuous>  dextrose 5%. 1000 milliLiter(s) (50 mL/Hr) IV Continuous <Continuous>  dextrose 50% Injectable 12.5 Gram(s) IV Push once  dextrose 50% Injectable 25 Gram(s) IV Push once  dextrose 50% Injectable 25 Gram(s) IV Push once  enoxaparin Injectable 40 milliGRAM(s) SubCutaneous daily  gabapentin 300 milliGRAM(s) Oral two times a day  insulin glargine Injectable (LANTUS) 15 Unit(s) SubCutaneous at bedtime  insulin lispro (HumaLOG) corrective regimen sliding scale   SubCutaneous three times a day before meals  insulin lispro (HumaLOG) corrective regimen sliding scale   SubCutaneous at bedtime  insulin lispro Injectable (HumaLOG) 5 Unit(s) SubCutaneous before breakfast  insulin lispro Injectable (HumaLOG) 5 Unit(s) SubCutaneous before lunch  insulin lispro Injectable (HumaLOG) 5 Unit(s) SubCutaneous before dinner  letrozole 2.5 milliGRAM(s) Oral daily  levothyroxine 137 MICROGram(s) Oral daily  losartan 100 milliGRAM(s) Oral daily  methylPREDNISolone sodium succinate Injectable 20 milliGRAM(s) IV Push two times a day  pantoprazole    Tablet 40 milliGRAM(s) Oral before breakfast  simethicone 80 milliGRAM(s) Chew two times a day  tiotropium 18 MICROgram(s) Capsule 1 Capsule(s) Inhalation daily    MEDICATIONS  (PRN):  acetaminophen    Suspension .. 650 milliGRAM(s) Oral every 6 hours PRN Mild Pain (1 - 3)  ALPRAZolam 0.25 milliGRAM(s) Oral two times a day PRN anxiety  dextrose 40% Gel 15 Gram(s) Oral once PRN Blood Glucose LESS THAN 70 milliGRAM(s)/deciliter  glucagon  Injectable 1 milliGRAM(s) IntraMuscular once PRN Glucose LESS THAN 70 milligrams/deciliter      REVIEW OF SYSTEMS:    seen with daughter by bedside  anxius  just sipped gingerale and had shortness  of breath  VITAL SIGNS:  T(C): 36.7 (11-18-19 @ 17:51), Max: 37.1 (11-17-19 @ 18:15)  T(F): 98 (11-18-19 @ 17:51), Max: 98.7 (11-17-19 @ 18:15)  HR: 102 (11-18-19 @ 17:51) (58 - 129)  BP: 157/79 (11-18-19 @ 17:51) (119/68 - 202/118)  RR: 18 (11-18-19 @ 17:51) (17 - 34)  SpO2: 100% (11-18-19 @ 17:51) (94% - 100%)  Wt(kg): --    PHYSICAL EXAM:    GENERAL:on bipap  mod resp distress  HEENT: Neck is supple, normocephalic, atraumatic   CHEST/LUNG: bilateral , rhonchi, wheezing; decreased air entry  HEART: Regular rate and rhythm; No murmurs, rubs, or gallops  ABDOMEN: Soft, Nontender, Nondistended; Bowel sounds present, no rebound   EXTREMITIES:  2+ Peripheral Pulses, No clubbing, cyanosis, or edema  SKIN: No rashes or lesions  BACK: no pressor sore   NERVOUS SYSTEM:  Alert & Oriented X3, LABS:                         11.5   14.89 )-----------( 286      ( 18 Nov 2019 06:33 )             36.6     11-17    142  |  104  |  29<H>  ----------------------------<  228<H>  4.3   |  30  |  1.02    Ca    9.6      17 Nov 2019 06:36  Mg     1.9     11-17                  Thyroid Stimulating Hormone, Serum: <0.005 uIU/mL (11-14 @ 08:19)                              Radiology:    < from: Xray Chest 1 View- PORTABLE-Urgent (11.13.19 @ 14:11) >    IMPRESSION:   1. Subcarinal mass better appreciated on 11/9/19 CT angio of chest.  2. Small right pleural effusion.  3. Right basilar atelectasis and/or pneumonia as above.                  LIAM LOGAN M.D., ATTENDING RADIOLOGIST  This document hasbeen electronically signed. Nov 13 2019  2:19PM                < end of copied text >  < from: CT Angio Chest w/ IV Cont (11.09.19 @ 23:01) >    IMPRESSION:     No central pulmonary embolism.    Large subcarinal mass with bihilar lymph nodes.    Obstructive pneumonitis versus pneumonia in the right middle and lower   lobes.    Left hepatic mass. Cannot exclude malignancy (primary or metastatic).   Further characterization is recommended.          < end of copied text >

## 2019-11-18 NOTE — PROGRESS NOTE ADULT - PROBLEM SELECTOR PROBLEM 3
Diabetes type 2, uncontrolled
Postoperative hypothyroidism
SOB (shortness of breath)

## 2019-11-18 NOTE — PROGRESS NOTE ADULT - PROBLEM SELECTOR PROBLEM 4
Essential hypertension
Malignant neoplasm of right lung, unspecified part of lung
Postoperative hypothyroidism

## 2019-11-18 NOTE — DISCHARGE NOTE PROVIDER - CARE PROVIDER_API CALL
your PMD,   Phone: (   )    -  Fax: (   )    -  Follow Up Time:     Your endocrinologist,   Phone: (   )    -  Fax: (   )    -  Follow Up Time:

## 2019-11-21 DIAGNOSIS — Z85.850 PERSONAL HISTORY OF MALIGNANT NEOPLASM OF THYROID: ICD-10-CM

## 2019-11-21 DIAGNOSIS — Z88.2 ALLERGY STATUS TO SULFONAMIDES: ICD-10-CM

## 2019-11-21 DIAGNOSIS — E11.9 TYPE 2 DIABETES MELLITUS WITHOUT COMPLICATIONS: ICD-10-CM

## 2019-11-21 DIAGNOSIS — I12.9 HYPERTENSIVE CHRONIC KIDNEY DISEASE WITH STAGE 1 THROUGH STAGE 4 CHRONIC KIDNEY DISEASE, OR UNSPECIFIED CHRONIC KIDNEY DISEASE: ICD-10-CM

## 2019-11-21 DIAGNOSIS — J96.01 ACUTE RESPIRATORY FAILURE WITH HYPOXIA: ICD-10-CM

## 2019-11-21 DIAGNOSIS — R06.02 SHORTNESS OF BREATH: ICD-10-CM

## 2019-11-21 DIAGNOSIS — R91.8 OTHER NONSPECIFIC ABNORMAL FINDING OF LUNG FIELD: ICD-10-CM

## 2019-11-21 DIAGNOSIS — Z85.118 PERSONAL HISTORY OF OTHER MALIGNANT NEOPLASM OF BRONCHUS AND LUNG: ICD-10-CM

## 2019-11-21 DIAGNOSIS — Z92.21 PERSONAL HISTORY OF ANTINEOPLASTIC CHEMOTHERAPY: ICD-10-CM

## 2019-11-21 DIAGNOSIS — N18.3 CHRONIC KIDNEY DISEASE, STAGE 3 (MODERATE): ICD-10-CM

## 2019-11-21 DIAGNOSIS — C34.91 MALIGNANT NEOPLASM OF UNSPECIFIED PART OF RIGHT BRONCHUS OR LUNG: ICD-10-CM

## 2019-11-21 DIAGNOSIS — J40 BRONCHITIS, NOT SPECIFIED AS ACUTE OR CHRONIC: ICD-10-CM

## 2019-11-21 DIAGNOSIS — J18.9 PNEUMONIA, UNSPECIFIED ORGANISM: ICD-10-CM

## 2019-11-21 DIAGNOSIS — E78.5 HYPERLIPIDEMIA, UNSPECIFIED: ICD-10-CM

## 2019-12-01 ENCOUNTER — OUTPATIENT (OUTPATIENT)
Dept: OUTPATIENT SERVICES | Facility: HOSPITAL | Age: 79
LOS: 1 days | End: 2019-12-01
Payer: MEDICARE

## 2019-12-01 DIAGNOSIS — Z98.890 OTHER SPECIFIED POSTPROCEDURAL STATES: Chronic | ICD-10-CM

## 2019-12-01 PROCEDURE — G9001: CPT

## 2019-12-10 DIAGNOSIS — Z71.89 OTHER SPECIFIED COUNSELING: ICD-10-CM

## 2019-12-10 PROBLEM — C34.90 MALIGNANT NEOPLASM OF UNSPECIFIED PART OF UNSPECIFIED BRONCHUS OR LUNG: Chronic | Status: ACTIVE | Noted: 2019-11-10

## 2019-12-10 PROBLEM — E11.65 TYPE 2 DIABETES MELLITUS WITH HYPERGLYCEMIA: Chronic | Status: ACTIVE | Noted: 2019-11-10

## 2019-12-10 PROBLEM — I10 ESSENTIAL (PRIMARY) HYPERTENSION: Chronic | Status: ACTIVE | Noted: 2019-11-10

## 2019-12-10 PROBLEM — E03.9 HYPOTHYROIDISM, UNSPECIFIED: Chronic | Status: ACTIVE | Noted: 2019-11-10

## 2022-01-20 NOTE — PATIENT PROFILE ADULT - SAFE PLACE TO LIVE
[facility] with [employer]. Prior to transfer from [facility], patient disconnected the call. This writer returned call to the patient x 2 and left VM for patient to return call to the office for appointment scheduling. Subjective: Caller states \"Chest pain that radiates into the shoulder per [facility]\"     Attention Provider: Thank you for allowing me to participate in the care of your patient. The patient was connected to triage in response to information provided to the [facility]. Please do not respond through this encounter as the response is not directed to a shared pool.         Reason for Disposition   Message left on identified voice mail    Protocols used: NO CONTACT OR DUPLICATE CONTACT CALL-ADULT-
no

## 2023-02-13 NOTE — DIETITIAN INITIAL EVALUATION ADULT. - COPY OF WEIGHT IN KG
58.9 Diet, DASH/TLC:   Sodium & Cholesterol Restricted  Consistent Carbohydrate {No Snacks} (CSTCHO) (02-12-23 @ 19:04)

## 2023-03-29 NOTE — PHYSICAL THERAPY INITIAL EVALUATION ADULT - MANUAL MUSCLE TESTING RESULTS, REHAB EVAL
Impression: Combined forms of age-related cataract, bilateral Plan: Continue to monitor. grossly assessed due to/BLE grossly graded 4-/5.